# Patient Record
Sex: MALE | Race: WHITE | NOT HISPANIC OR LATINO | Employment: OTHER | URBAN - METROPOLITAN AREA
[De-identification: names, ages, dates, MRNs, and addresses within clinical notes are randomized per-mention and may not be internally consistent; named-entity substitution may affect disease eponyms.]

---

## 2018-06-20 ENCOUNTER — OFFICE VISIT (OUTPATIENT)
Dept: NEUROLOGY | Facility: MEDICAL CENTER | Age: 68
End: 2018-06-20
Payer: MEDICARE

## 2018-06-20 VITALS
OXYGEN SATURATION: 96 % | HEART RATE: 61 BPM | SYSTOLIC BLOOD PRESSURE: 116 MMHG | TEMPERATURE: 97.9 F | DIASTOLIC BLOOD PRESSURE: 62 MMHG | HEIGHT: 72 IN | WEIGHT: 196.21 LBS | BODY MASS INDEX: 26.58 KG/M2

## 2018-06-20 DIAGNOSIS — R41.3 MEMORY PROBLEM: ICD-10-CM

## 2018-06-20 DIAGNOSIS — G62.9 NEUROPATHY: ICD-10-CM

## 2018-06-20 DIAGNOSIS — R41.82 ALTERED MENTAL STATUS, UNSPECIFIED ALTERED MENTAL STATUS TYPE: ICD-10-CM

## 2018-06-20 PROCEDURE — 99204 OFFICE O/P NEW MOD 45 MIN: CPT | Performed by: PSYCHIATRY & NEUROLOGY

## 2018-06-20 RX ORDER — METOPROLOL SUCCINATE 25 MG/1
25 TABLET, EXTENDED RELEASE ORAL DAILY
COMMUNITY

## 2018-06-20 RX ORDER — TRAZODONE HYDROCHLORIDE 150 MG/1
150 TABLET ORAL NIGHTLY
COMMUNITY

## 2018-06-20 RX ORDER — FLUPHENAZINE HYDROCHLORIDE 2.5 MG/1
2.5 TABLET ORAL DAILY
COMMUNITY

## 2018-06-20 ASSESSMENT — PATIENT HEALTH QUESTIONNAIRE - PHQ9: CLINICAL INTERPRETATION OF PHQ2 SCORE: 0

## 2018-06-20 NOTE — PROGRESS NOTES
"CC: Altered Mental Status      HPI:    Kaushik Siegel is a 67 y.o. male with pmhx of schizophrenia, thrombocytopenia, dacryocystitis, hld, htn, hematuria,  who presents today in initial neurologic consultation for altered mental status consisting of confusion and forgetfulness since x 6 months with urinary incontinence over the past 3-4 months. He was referred by his primary care provider CAMELIA Selby, and is accompanied by his care coordinator, Emelia.     Mr. Siegel currently lives in a provider home and has for many years. He is well known to the staff, and recently, has been exhibiting different behaviors which they are concerned are different from his baseline.     He has not been sleeping despite 150mg of Trazodone. His , Emelia tells me that he has been very forgetful. She has known him over one year. She has noticed that he is getting worse. He has been with [facility] for about 5 years. He has been waking up at night and cooking lots of meals for their \"32,500 babies\" and his \"wife\" Salena - although he does not really have a wife. He does not eat the meals that he cooks. The staff at [facility] reports that he sleeps for only a couple hours at a time and then is up, going out, smoking cigarettes. He has been exhibiting more forgetfulness, not knowing what simple things mean. Had been trying to sign up for his post in [place], but he was never in the . He also takes many showers per day.       ROS:   Constitutional: No fevers or chills.  Eyes: No blurry vision or eye pain.  ENT: No dysphagia or hearing loss.  Respiratory: No cough or shortness of breath.  Cardiovascular: No chest pain or palpitations.  GI: No nausea, vomiting, or diarrhea.  : No urinary incontinence or dysuria.  Musculoskeletal: No joint swelling or arthralgias.  Skin: No skin rashes.  Neuro: No headaches, dizziness, or tremors.  Psych: +hallucinations.      Past Medical History:   Past Medical History: "   Diagnosis Date   • Depression    • Hyperlipemia    • Hypertension    • Schizophrenia (HCC)        Past Surgical History:   Past Surgical History:   Procedure Laterality Date   • OTHER ORTHOPEDIC SURGERY      R arm   • TONSILLECTOMY         Social History:   Social History     Social History   • Marital status: Single     Spouse name: N/A   • Number of children: N/A   • Years of education: N/A     Occupational History   • Not on file.     Social History Main Topics   • Smoking status: Current Every Day Smoker     Packs/day: 1.00     Years: 46.00     Types: Cigarettes   • Smokeless tobacco: Never Used   • Alcohol use No   • Drug use: No   • Sexual activity: Not on file     Other Topics Concern   •  Service No     Social History Narrative   • No narrative on file       Allergies: No Known Allergies  Ambulatory Vitals  Encounter Vitals  Temperature: 36.6 °C (97.9 °F)  Temp Source: Temporal  Blood Pressure : 116/62  BP Location: Upper Arm  Patient BP Position: Sitting  Pulse: 61  Pulse Oximetry: 96 %  Weight: 89 kg (196 lb 3.4 oz)  Weight Source: Stand Up Scale  Height: 182.9 cm (6')  BMI (Calculated): 26.61      Physical Exam:   Constitutional: Elder appearing male.   Cardiovascular: RRR, with no murmurs, rubs or gallops. No carotid bruits.   Respiratory: Lungs CTA B/L, no W/R/R.   Abdomen: Soft Non-tender to Palpation. Non-distended.  Extremities: No peripheral edema, pedal pulses intact.   Skin: Warm, dry, intact. No rashes observed.  Eyes: Sclera anicteric  Neurologic:   Mental Status: Awake, alert, oriented x 3.   Speech: Fluent with normal prosody.   Concentration: At times inattentive, distracted, talking to hallucinations.   Cranial Nerves:    CN II: PERRL. No afferent pupillary defect.    CN III, IV, VI: Good eye closure, EOMI.     CN V: Facial sensation intact and symmetric.     CN VII: No facial asymmetry.     CN VIII: Hearing intact.     CN IX and X: Palate elevates symmetrically. Normal gag  reflex.    CN XI: Symmetric shoulder shrug.     CN XII: Tongue midline.    Sensory: Decreased vibration sense in bilateral feet. Intact temperature.   Coordination: No evidence of past-pointing on finger to nose testing, no dysdiadochokinesia. Heel to shin intact.             DTR's: 2+ throughout without clonus.    Babinski: Toes downgoing bilaterally.   Romberg: Negative.   Movements: No tremors observed.   Musculoskeletal:    Strength: 5/5 in upper and lower extremities bilaterally.   Gait: Steady, narrow based.    Tone: Normal bulk and tone.   Joints: No swelling.     Assessment/Plan:  Memory problem  66 yo M with pmhx of schizophrenia, depression, hld, and htn, presents with 6 months of increasing confusion and forgetfulness with urinary incontinence over the past 3-4 months. Given these recent symptoms, I recommended we start with a brain MRI and lab studies to include reversible causes of dementia.     Plan:  1. Check brain MRI w/wo contrast  2. Check TSH, B12, folate, CBC, CMP, RPR    Altered mental status  Altered mental status with urinary incontinence. Checking brain MRI. Would also check EEG.     Plan:  1. Referral for routine EEG.     Neuropathy (HCC)  Checking vitamin B12, folate, HIV, hepatitis, Serum Protein Electrophoresis w immunofixation.          Verito Malagon D.O., M.P.H  MS specialist.   Board Certified Neurologist.  Neurology Clerkship Director, [facility].    Neurology,  [facility].   Tel: [phone]  Fax: [phone]

## 2018-07-08 PROBLEM — R41.82 ALTERED MENTAL STATUS: Status: ACTIVE | Noted: 2018-07-08

## 2018-07-08 PROBLEM — R41.3 MEMORY PROBLEM: Status: ACTIVE | Noted: 2018-07-08

## 2018-07-08 PROBLEM — G62.9 NEUROPATHY: Status: ACTIVE | Noted: 2018-07-08

## 2018-07-08 NOTE — ASSESSMENT & PLAN NOTE
Altered mental status with urinary incontinence. Checking brain MRI. Would also check EEG.     Plan:  1. Referral for routine EEG.

## 2018-07-08 NOTE — ASSESSMENT & PLAN NOTE
68 yo M with pmhx of schizophrenia, depression, hld, and htn, presents with 6 months of increasing confusion and forgetfulness with urinary incontinence over the past 3-4 months. Given these recent symptoms, I recommended we start with a brain MRI and lab studies to include reversible causes of dementia.     Plan:  1. Check brain MRI w/wo contrast  2. Check TSH, B12, folate, CBC, CMP, RPR

## 2018-07-12 ENCOUNTER — HOSPITAL ENCOUNTER (OUTPATIENT)
Dept: RADIOLOGY | Facility: MEDICAL CENTER | Age: 68
End: 2018-07-12
Attending: PSYCHIATRY & NEUROLOGY
Payer: MEDICARE

## 2018-07-12 ENCOUNTER — HOSPITAL ENCOUNTER (OUTPATIENT)
Dept: LAB | Facility: MEDICAL CENTER | Age: 68
End: 2018-07-12
Attending: PSYCHIATRY & NEUROLOGY
Payer: MEDICARE

## 2018-07-12 DIAGNOSIS — R41.3 MEMORY PROBLEM: ICD-10-CM

## 2018-07-12 DIAGNOSIS — R41.82 ALTERED MENTAL STATUS, UNSPECIFIED ALTERED MENTAL STATUS TYPE: ICD-10-CM

## 2018-07-12 DIAGNOSIS — G62.9 NEUROPATHY: ICD-10-CM

## 2018-07-12 LAB
ALBUMIN SERPL BCP-MCNC: 4.3 G/DL (ref 3.2–4.9)
ALBUMIN/GLOB SERPL: 2 G/DL
ALP SERPL-CCNC: 55 U/L (ref 30–99)
ALT SERPL-CCNC: 15 U/L (ref 2–50)
ANION GAP SERPL CALC-SCNC: 7 MMOL/L (ref 0–11.9)
AST SERPL-CCNC: 18 U/L (ref 12–45)
BASOPHILS # BLD AUTO: 0.4 % (ref 0–1.8)
BASOPHILS # BLD: 0.03 K/UL (ref 0–0.12)
BILIRUB SERPL-MCNC: 0.8 MG/DL (ref 0.1–1.5)
BUN SERPL-MCNC: 34 MG/DL (ref 8–22)
CALCIUM SERPL-MCNC: 9.1 MG/DL (ref 8.5–10.5)
CHLORIDE SERPL-SCNC: 102 MMOL/L (ref 96–112)
CO2 SERPL-SCNC: 30 MMOL/L (ref 20–33)
CREAT BLD-MCNC: 1.5 MG/DL (ref 0.5–1.4)
CREAT SERPL-MCNC: 1.46 MG/DL (ref 0.5–1.4)
EOSINOPHIL # BLD AUTO: 0.1 K/UL (ref 0–0.51)
EOSINOPHIL NFR BLD: 1.3 % (ref 0–6.9)
ERYTHROCYTE [DISTWIDTH] IN BLOOD BY AUTOMATED COUNT: 50.4 FL (ref 35.9–50)
EST. AVERAGE GLUCOSE BLD GHB EST-MCNC: 108 MG/DL
FOLATE SERPL-MCNC: 13.2 NG/ML
GLOBULIN SER CALC-MCNC: 2.2 G/DL (ref 1.9–3.5)
GLUCOSE SERPL-MCNC: 77 MG/DL (ref 65–99)
HAV IGM SERPL QL IA: NEGATIVE
HBA1C MFR BLD: 5.4 % (ref 0–5.6)
HBV CORE IGM SER QL: NEGATIVE
HBV SURFACE AG SER QL: NEGATIVE
HCT VFR BLD AUTO: 48.7 % (ref 42–52)
HCV AB SER QL: NEGATIVE
HGB BLD-MCNC: 16.5 G/DL (ref 14–18)
IMM GRANULOCYTES # BLD AUTO: 0.01 K/UL (ref 0–0.11)
IMM GRANULOCYTES NFR BLD AUTO: 0.1 % (ref 0–0.9)
LYMPHOCYTES # BLD AUTO: 4.5 K/UL (ref 1–4.8)
LYMPHOCYTES NFR BLD: 57.3 % (ref 22–41)
MCH RBC QN AUTO: 34.5 PG (ref 27–33)
MCHC RBC AUTO-ENTMCNC: 33.9 G/DL (ref 33.7–35.3)
MCV RBC AUTO: 101.9 FL (ref 81.4–97.8)
MONOCYTES # BLD AUTO: 0.33 K/UL (ref 0–0.85)
MONOCYTES NFR BLD AUTO: 4.2 % (ref 0–13.4)
NEUTROPHILS # BLD AUTO: 2.89 K/UL (ref 1.82–7.42)
NEUTROPHILS NFR BLD: 36.7 % (ref 44–72)
NRBC # BLD AUTO: 0 K/UL
NRBC BLD-RTO: 0 /100 WBC
PLATELET # BLD AUTO: 66 K/UL (ref 164–446)
PMV BLD AUTO: 12.8 FL (ref 9–12.9)
POTASSIUM SERPL-SCNC: 4.2 MMOL/L (ref 3.6–5.5)
PROT SERPL-MCNC: 6.5 G/DL (ref 6–8.2)
RBC # BLD AUTO: 4.78 M/UL (ref 4.7–6.1)
SODIUM SERPL-SCNC: 139 MMOL/L (ref 135–145)
TREPONEMA PALLIDUM IGG+IGM AB [PRESENCE] IN SERUM OR PLASMA BY IMMUNOASSAY: NON REACTIVE
TSH SERPL DL<=0.005 MIU/L-ACNC: 1.69 UIU/ML (ref 0.38–5.33)
VIT B12 SERPL-MCNC: 334 PG/ML (ref 211–911)
WBC # BLD AUTO: 7.9 K/UL (ref 4.8–10.8)

## 2018-07-12 PROCEDURE — 86780 TREPONEMA PALLIDUM: CPT

## 2018-07-12 PROCEDURE — 80053 COMPREHEN METABOLIC PANEL: CPT

## 2018-07-12 PROCEDURE — 84160 ASSAY OF PROTEIN ANY SOURCE: CPT

## 2018-07-12 PROCEDURE — 87535 HIV-1 PROBE&REVERSE TRNSCRPJ: CPT

## 2018-07-12 PROCEDURE — 84443 ASSAY THYROID STIM HORMONE: CPT

## 2018-07-12 PROCEDURE — 82746 ASSAY OF FOLIC ACID SERUM: CPT

## 2018-07-12 PROCEDURE — 80074 ACUTE HEPATITIS PANEL: CPT

## 2018-07-12 PROCEDURE — 700117 HCHG RX CONTRAST REV CODE 255: Performed by: PSYCHIATRY & NEUROLOGY

## 2018-07-12 PROCEDURE — A9585 GADOBUTROL INJECTION: HCPCS | Performed by: PSYCHIATRY & NEUROLOGY

## 2018-07-12 PROCEDURE — 82607 VITAMIN B-12: CPT

## 2018-07-12 PROCEDURE — 85025 COMPLETE CBC W/AUTO DIFF WBC: CPT

## 2018-07-12 PROCEDURE — 36415 COLL VENOUS BLD VENIPUNCTURE: CPT

## 2018-07-12 PROCEDURE — 82565 ASSAY OF CREATININE: CPT

## 2018-07-12 PROCEDURE — 84165 PROTEIN E-PHORESIS SERUM: CPT

## 2018-07-12 PROCEDURE — 70553 MRI BRAIN STEM W/O & W/DYE: CPT

## 2018-07-12 PROCEDURE — 83036 HEMOGLOBIN GLYCOSYLATED A1C: CPT

## 2018-07-12 RX ORDER — GADOBUTROL 604.72 MG/ML
7.5 INJECTION INTRAVENOUS ONCE
Status: COMPLETED | OUTPATIENT
Start: 2018-07-12 | End: 2018-07-12

## 2018-07-12 RX ADMIN — GADOBUTROL 7.5 ML: 604.72 INJECTION INTRAVENOUS at 10:10

## 2018-07-14 LAB
ALBUMIN SERPL-MCNC: 4.29 G/DL (ref 3.75–5.01)
ALPHA1 GLOB SERPL ELPH-MCNC: 0.26 G/DL (ref 0.19–0.46)
ALPHA2 GLOB SERPL ELPH-MCNC: 0.76 G/DL (ref 0.48–1.05)
B-GLOBULIN SERPL ELPH-MCNC: 0.66 G/DL (ref 0.48–1.1)
GAMMA GLOB SERPL ELPH-MCNC: 0.92 G/DL (ref 0.62–1.51)
INTERPRETATION SERPL IFE-IMP: NORMAL
MONOCLON BAND OBS SERPL: NORMAL
PATHOLOGY STUDY: NORMAL
PROT SERPL-MCNC: 6.9 G/DL (ref 6–8.3)

## 2018-07-17 LAB — HIV 1 PRO DNA BLD QL NAA+PROBE: NOT DETECTED

## 2018-09-20 PROBLEM — D69.6 THROMBOCYTOPENIA (HCC): Status: ACTIVE | Noted: 2018-09-20

## 2018-09-20 PROBLEM — N19 RENAL FAILURE: Status: ACTIVE | Noted: 2018-09-20

## 2018-10-15 ENCOUNTER — APPOINTMENT (OUTPATIENT)
Dept: NEUROLOGY | Facility: MEDICAL CENTER | Age: 68
End: 2018-10-15
Payer: MEDICARE

## 2018-10-15 ENCOUNTER — NON-PROVIDER VISIT (OUTPATIENT)
Dept: NEUROLOGY | Facility: MEDICAL CENTER | Age: 68
End: 2018-10-15
Payer: MEDICARE

## 2018-10-15 DIAGNOSIS — R41.82 MENTAL STATUS, DECREASED: ICD-10-CM

## 2018-10-15 PROCEDURE — 95951 EEG: CPT | Mod: 52 | Performed by: PSYCHIATRY & NEUROLOGY

## 2018-10-15 NOTE — PROCEDURES
VIDEO ELECTROENCEPHALOGRAM / EPILEPSY MONITORING UNIT REPORT      Referring provider: Dr. Malagon    DOS:   10/15/2018      INDICATION:  Kaushik Siegel 67 y.o. male presenting with Referral from Dr. Malagon for VEEG. Dx AMS. Pt with PMH of schizophrenia, thrombocytopenia, HTN, dacryocystitis. Pt lives in group home Sx reported of confusion and forgetfulness for the last 6, months. Study R/O subclinical seizures    CURRENT ANTIEPILEPTIC REGIMEN: NONE     DURATION: 24 minutes      TECHNIQUE: A 30-channel video electroencephalogram (VEEG) was performed in accordance with the international 10-20 system. This digital study was reviewed in bipolar and referential montages. The recording examined the patient during wakeful, drowsy and sleep states.         DESCRIPTION OF THE RECORD:  During the awake state, background shows symmetrical 10-11 Hz alpha activity posteriorly with amplitude of 70 mV.  There was reactivity with eye opening/closure.  Normal anterior-posterior gradient was noted with faster beta frequencies seen anteriorly.  During drowsiness, high-amplitude delta frequencies were seen. There are few sharp waves over left temporal -- wicket spikes vs epileptiform ( I favor wicket spikes since no focal slowing)    During the sleep state, background shows diffuse high-amplitude 4-5 Hz delta activity.  Symmetrical high-amplitude sleep spindles and vertex sharp activities were seen in the central leads.    ACTIVATION PROCEDURES:   Hyperventilation was performed by the patient. The technician performing the test noted good effort. This technique produced electroencephalographic changes in keeping with the expected bilaterally synchronous, frontally predominant, high amplitude slow waves build up.     Intermittent Photic stimulation was performed in a stepwise fashion from 1 to 30 Hz and elicited a normal response (photic driving), most noticeable in the posterior leads.      ICTAL AND/OR INTERICTAL FINDINGS:   No focal or  generalized epileptiform activity was noted. No regional slowing was seen during this study.  No seizures were recorded during the study.     EVENT(S):  NONE    EKG: sampling review of EKG recording demonstrated irregular rhythm      INTERPRETATION:       ________________________________________________________________________    This is normal routine video EEG recording in the awake and drowsy/sleep state(s).    This scalp video EEG remains not remarkable (despite cardiac arrhthymias and probable wicket spikes)    Of note, unremarkable EEG does not completely exclude the diagnosis  of seizures since seizure is an episodic phenomena and frontal lobe seizures could have normal scalp EEG. Clinical correlation may help     If clinical suspicion of seizure remains high.  Prolonged EEG   monitoring may be of help.    ________________________________________________________________________

## 2018-12-19 ENCOUNTER — OFFICE VISIT (OUTPATIENT)
Dept: NEUROLOGY | Facility: MEDICAL CENTER | Age: 68
End: 2018-12-19
Payer: MEDICARE

## 2018-12-19 VITALS
HEIGHT: 72 IN | SYSTOLIC BLOOD PRESSURE: 116 MMHG | TEMPERATURE: 98.2 F | OXYGEN SATURATION: 94 % | HEART RATE: 95 BPM | BODY MASS INDEX: 25.19 KG/M2 | DIASTOLIC BLOOD PRESSURE: 64 MMHG | WEIGHT: 186 LBS

## 2018-12-19 DIAGNOSIS — D69.6 THROMBOCYTOPENIA (HCC): ICD-10-CM

## 2018-12-19 DIAGNOSIS — R41.82 ALTERED MENTAL STATUS, UNSPECIFIED ALTERED MENTAL STATUS TYPE: ICD-10-CM

## 2018-12-19 PROCEDURE — 99215 OFFICE O/P EST HI 40 MIN: CPT | Performed by: PSYCHIATRY & NEUROLOGY

## 2018-12-20 NOTE — PROGRESS NOTES
"CC:       HPI:    Kaushik Siegel is a 67 y.o. male with pmhx of schizophrenia, thrombocytopenia, hld, htn and hematuria who presents today from his group home with one of his case workers who provides his history. He is here for follow up for Neurological follow up for altered mental status. He was last seen on 6/20/18.     Since his last visit, he has had bloodwork and an MRI of his brain. He has thrombocytopenia and an elevated MCV. The brain MRI did not show any focal abnormalities.     His  describes the \"altered mental status.\" He says that Mr. Siegel has weeks where he is very witty and sharp. Then he has days of confusion. He perseverates on things and seems to have worsened short term memory. For instance, he continuously asks for cigarettes not seeming to remember that he had one about 20 minutes prior. He has had changes in his attitude, getting upset at times. .     ROS:   Constitutional: No fevers or chills.  Eyes: No blurry vision or eye pain.  ENT: No dysphagia or hearing loss.  Respiratory: No cough or shortness of breath.  Cardiovascular: No chest pain or palpitations.  GI: No nausea, vomiting, or diarrhea.  : No urinary incontinence or dysuria.  Musculoskeletal: No joint swelling or arthralgias.  Skin: No skin rashes.  Neuro: No headaches, dizziness, or tremors.  Endocrine: No heat or cold intolerance. No polydipsia or polyuria.  Psych: No depression or anxiety.  Heme/Lymph: No easy bruising or swollen lymph nodes.      Past Medical History:   Past Medical History:   Diagnosis Date   • Depression    • Hyperlipemia    • Hypertension    • Schizophrenia (HCC)        Past Surgical History:   Past Surgical History:   Procedure Laterality Date   • OTHER ORTHOPEDIC SURGERY      R arm   • TONSILLECTOMY         Social History:   Social History     Social History   • Marital status: Single     Spouse name: N/A   • Number of children: N/A   • Years of education: N/A     Occupational History   • " Not on file.     Social History Main Topics   • Smoking status: Current Every Day Smoker     Packs/day: 1.00     Years: 46.00     Types: Cigarettes   • Smokeless tobacco: Never Used   • Alcohol use No   • Drug use: No   • Sexual activity: Not on file     Other Topics Concern   •  Service No     Social History Narrative   • No narrative on file       Family History:   Family History   Problem Relation Age of Onset   • Family history unknown: Yes       Allergies: No Known Allergies    Physical Exam:     Ambulatory Vitals  Encounter Vitals  Temperature: 36.8 °C (98.2 °F)  Temp src: Temporal  Blood Pressure : 116/64  Pulse: 95  Pulse Oximetry: 94 %  Weight: 84.4 kg (186 lb)  Height: 182.9 cm (6')  BMI (Calculated): 25.23  Constitutional: Well-developed, well-nourished, good hygiene. Appears stated age.  Cardiovascular: RRR, with no murmurs, rubs or gallops. No carotid bruits.   Respiratory: Lungs CTA B/L, no W/R/R.   Extremities: No peripheral edema, pedal pulses intact.   Skin: Warm, dry, intact. No rashes observed.  Eyes: Sclera anicteric  Neurologic:   Mental Status: Awake, alert, oriented x 3.   Speech: Fluent with normal prosody.    Concentration: Able to follow multi-step commands.              Fund of Knowledge: Appropriate   Cranial Nerves:    CN II: PERRL. No afferent pupillary defect.    CN III, IV, VI: Good eye closure, EOMI.     CN V: Facial sensation intact and symmetric.     CN VII: No facial asymmetry.     CN VIII: Hearing intact.     CN IX and X: Palate elevates symmetrically. Normal gag reflex.    CN XI: Symmetric shoulder shrug.     CN XII: Tongue midline.    Sensory: Intact light touch, vibration and temperature.    Coordination: No evidence of past-pointing on finger to nose testing, no dysdiadochokinesia. Heel to shin intact.             DTR's: 2+ throughout without clonus.    Babinski: Toes downgoing bilaterally.   Romberg: Negative.   Movements: No tremors observed.   Musculoskeletal:  "   Strength: 5/5 in upper and lower extremities bilaterally.   Gait: Slow shuffling gait.    Tone: Normal bulk and tone. No Cogwheeling.   Joints: No swelling.   Psychiatric: Tangential and talking nonsensically about the \"thousands of babies that live in his group home.\"     Labs Reviewed:    Results for LUIS EDUARDO MEJIA (MRN [id number]) as of 12/29/2018 21:29   Ref. Range 7/12/2018 10:15   WBC Latest Ref Range: 4.8 - 10.8 K/uL 7.9   RBC Latest Ref Range: 4.70 - 6.10 M/uL 4.78   Hemoglobin Latest Ref Range: 14.0 - 18.0 g/dL 16.5   Hematocrit Latest Ref Range: 42.0 - 52.0 % 48.7   MCV Latest Ref Range: 81.4 - 97.8 fL 101.9 (H)   MCH Latest Ref Range: 27.0 - 33.0 pg 34.5 (H)   MCHC Latest Ref Range: 33.7 - 35.3 g/dL 33.9   RDW Latest Ref Range: 35.9 - 50.0 fL 50.4 (H)   Platelet Count Latest Ref Range: 164 - 446 K/uL 66 (L)   MPV Latest Ref Range: 9.0 - 12.9 fL 12.8   Neutrophils-Polys Latest Ref Range: 44.00 - 72.00 % 36.70 (L)   Neutrophils (Absolute) Latest Ref Range: 1.82 - 7.42 K/uL 2.89   Lymphocytes Latest Ref Range: 22.00 - 41.00 % 57.30 (H)   Lymphs (Absolute) Latest Ref Range: 1.00 - 4.80 K/uL 4.50   Monocytes Latest Ref Range: 0.00 - 13.40 % 4.20   Monos (Absolute) Latest Ref Range: 0.00 - 0.85 K/uL 0.33   Eosinophils Latest Ref Range: 0.00 - 6.90 % 1.30   Eos (Absolute) Latest Ref Range: 0.00 - 0.51 K/uL 0.10   Basophils Latest Ref Range: 0.00 - 1.80 % 0.40   Baso (Absolute) Latest Ref Range: 0.00 - 0.12 K/uL 0.03   Immature Granulocytes Latest Ref Range: 0.00 - 0.90 % 0.10   Immature Granulocytes (abs) Latest Ref Range: 0.00 - 0.11 K/uL 0.01   Nucleated RBC Latest Units: /100 WBC 0.00   NRBC (Absolute) Latest Units: K/uL 0.00   Sodium Latest Ref Range: 135 - 145 mmol/L 139   Potassium Latest Ref Range: 3.6 - 5.5 mmol/L 4.2   Chloride Latest Ref Range: 96 - 112 mmol/L 102   Co2 Latest Ref Range: 20 - 33 mmol/L 30   Anion Gap Latest Ref Range: 0.0 - 11.9  7.0   Glucose Latest Ref Range: 65 - 99 mg/dL 77   Bun " Latest Ref Range: 8 - 22 mg/dL 34 (H)   Creatinine Latest Ref Range: 0.50 - 1.40 mg/dL 1.46 (H)   GFR If  Latest Ref Range: >60 mL/min/1.73 m 2 58 (A)   GFR If Non  Latest Ref Range: >60 mL/min/1.73 m 2 48 (A)   Calcium Latest Ref Range: 8.5 - 10.5 mg/dL 9.1   AST(SGOT) Latest Ref Range: 12 - 45 U/L 18   ALT(SGPT) Latest Ref Range: 2 - 50 U/L 15   Alkaline Phosphatase Latest Ref Range: 30 - 99 U/L 55   Total Bilirubin Latest Ref Range: 0.1 - 1.5 mg/dL 0.8   Albumin Latest Ref Range: 3.2 - 4.9 g/dL 4.3   Total Protein Latest Ref Range: 6.0 - 8.2 g/dL 6.5   Globulin Latest Ref Range: 1.9 - 3.5 g/dL 2.2   A-G Ratio Latest Units: g/dL 2.0   Glycohemoglobin Latest Ref Range: 0.0 - 5.6 % 5.4   Estim. Avg Glu Latest Units: mg/dL 108   Vitamin B12 -True Cobalamin Latest Ref Range: 211 - 911 pg/mL 334   Folate -Folic Acid Latest Ref Range: >4.0 ng/mL 13.2   TSH Latest Ref Range: 0.380 - 5.330 uIU/mL 1.690   Hepatitis A Virus Ab, IgM Latest Ref Range: Negative  Negative   Hepatitis B Surface Antigen Latest Ref Range: Negative  Negative   Hepatitis B Cors Ab,IgM Latest Ref Range: Negative  Negative   Hepatitis C Antibody Latest Ref Range: Negative  Negative   HIV Dna By Pcr Latest Ref Range: Not Detected  Not Detected   Syphilis, Treponemal Qual Latest Ref Range: Non Reactive  Non Reactive   BENITEZ Reflex Unknown Not Done   Interpretation Unknown See Note   Total Protein Latest Ref Range: 6.00 - 8.30 g/dL 6.90   Albumin Latest Ref Range: 3.75 - 5.01 g/dL 4.29   Gamma Globulin Latest Ref Range: 0.62 - 1.51 g/dL 0.92   Alpha-1 Globulin Latest Ref Range: 0.19 - 0.46 g/dL 0.26   Alpha-2 Globulin Latest Ref Range: 0.48 - 1.05 g/dL 0.76   Beta Globulin Latest Ref Range: 0.48 - 1.10 g/dL 0.66   EER Serum Prot. Electro. Reflex Unknown See Note       Imaging:   Brain MRI w/wo contrast fro 7/12/18  1.  Age-related cerebral atrophy.  2.  Mild to moderate periventricular and juxtacortical white matter changes  consistent with chronic microvascular ischemic gliosis.    Assessment/Plan:  Altered mental status  67 yo M with schizophrenia, htn, hld, thrombocytopenia, and hematuria noted by group home case workers to have internittently altered mental status. Today we reviewed his brain MRI which showed only age related atrophy and a small amount of chronic small vessel ischemic change but nothing to explain altered mental status. His routine video EEG was negative for any focal sharps or spikes to suggest an nunderlying seizure disorder. While he has been antipsychotics for many years which could cause his underlying thrombocytopenia and cytomegaly, this needs to be investigated. During both of our encounters, he has been hallucinating, quietly mumbling to himself due to his schizophrenia making it difficult to assess his memory. He was awake and alert, however and able to follow directions. If there is an underlying dementia developing, that will be quite difficult to assess given his psychiatric issues. I suggested a hematology consult to investigate the thrombocytopenia and that he return to his primary care provider for further metabolic workup. If he develops new symptoms or exhibits additional pathology suggestive of seizure activity, they should return for re-evaluation so we can do prolonged EEG monitoring.    Plan:  1. Hematology referral placed  2. Return to primary care provider for further workup of metabolic caues of altered mental status.       Greater than 50% of this 40 minute face to face encounter was devoted to disease state counseling on altered mental status and coordination of care. Additional time was spent on lab and MRI review. Please see above assessment and plan for discussion.       Verito Malagon D.O., M.P.H  MS specialist.   Board Certified Neurologist.  Neurology Clerkship Director, [facility].    Neurology,  [facility]  [facility].   Tel: [phone]  Fax: [phone]

## 2018-12-30 NOTE — ASSESSMENT & PLAN NOTE
69 yo M with schizophrenia, htn, hld, thrombocytopenia, and hematuria noted by group home case workers to have internittently altered mental status. Today we reviewed his brain MRI which showed only age related atrophy and a small amount of chronic small vessel ischemic change but nothing to explain altered mental status. His routine video EEG was negative for any focal sharps or spikes to suggest an nunderlying seizure disorder. While he has been antipsychotics for many years which could cause his underlying thrombocytopenia and cytomegaly, this needs to be investigated. During both of our encounters, he has been hallucinating, quietly mumbling to himself due to his schizophrenia making it difficult to assess his memory. He was awake and alert, however and able to follow directions. If there is an underlying dementia developing, that will be quite difficult to assess given his psychiatric issues. I suggested a hematology consult to investigate the thrombocytopenia and that he return to his primary care provider for further metabolic workup. If he develops new symptoms or exhibits additional pathology suggestive of seizure activity, they should return for re-evaluation so we can do prolonged EEG monitoring.    Plan:  1. Hematology referral placed  2. Return to primary care provider for further workup of metabolic caues of altered mental status.

## 2019-02-06 ENCOUNTER — HOSPITAL ENCOUNTER (OUTPATIENT)
Dept: RADIOLOGY | Facility: MEDICAL CENTER | Age: 69
End: 2019-02-06
Attending: INTERNAL MEDICINE
Payer: MEDICARE

## 2019-02-06 DIAGNOSIS — D69.6 THROMBOCYTOPENIA, UNSPECIFIED (HCC): ICD-10-CM

## 2019-02-06 PROCEDURE — 76700 US EXAM ABDOM COMPLETE: CPT

## 2019-03-05 ENCOUNTER — HOSPITAL ENCOUNTER (OUTPATIENT)
Dept: LAB | Facility: MEDICAL CENTER | Age: 69
End: 2019-03-05
Attending: PHYSICIAN ASSISTANT
Payer: MEDICARE

## 2019-03-05 PROCEDURE — 87186 SC STD MICRODIL/AGAR DIL: CPT

## 2019-03-05 PROCEDURE — 87086 URINE CULTURE/COLONY COUNT: CPT

## 2019-03-05 PROCEDURE — 87077 CULTURE AEROBIC IDENTIFY: CPT

## 2019-03-06 LAB
AMBIGUOUS DTTM AMBI4: NORMAL
SIGNIFICANT IND 70042: NORMAL
SITE SITE: NORMAL
SOURCE SOURCE: NORMAL

## 2019-03-08 LAB
BACTERIA UR CULT: ABNORMAL
BACTERIA UR CULT: ABNORMAL
SIGNIFICANT IND 70042: ABNORMAL
SITE SITE: ABNORMAL
SOURCE SOURCE: ABNORMAL

## 2019-06-24 ENCOUNTER — OFFICE VISIT (OUTPATIENT)
Dept: NEUROLOGY | Facility: MEDICAL CENTER | Age: 69
End: 2019-06-24
Payer: MEDICARE

## 2019-06-24 VITALS
SYSTOLIC BLOOD PRESSURE: 148 MMHG | BODY MASS INDEX: 27.63 KG/M2 | DIASTOLIC BLOOD PRESSURE: 76 MMHG | WEIGHT: 204 LBS | HEIGHT: 72 IN | HEART RATE: 78 BPM | OXYGEN SATURATION: 93 % | TEMPERATURE: 98.4 F

## 2019-06-24 DIAGNOSIS — R25.1 TREMOR: ICD-10-CM

## 2019-06-24 DIAGNOSIS — R41.3 MEMORY PROBLEM: ICD-10-CM

## 2019-06-24 PROCEDURE — 99214 OFFICE O/P EST MOD 30 MIN: CPT | Performed by: PSYCHIATRY & NEUROLOGY

## 2019-06-24 RX ORDER — ATORVASTATIN CALCIUM 20 MG/1
20 TABLET, FILM COATED ORAL NIGHTLY
COMMUNITY

## 2019-06-24 RX ORDER — TAMSULOSIN HYDROCHLORIDE 0.4 MG/1
0.4 CAPSULE ORAL
COMMUNITY

## 2019-06-24 ASSESSMENT — ENCOUNTER SYMPTOMS
FEVER: 0
NAUSEA: 0
DIARRHEA: 0
VOMITING: 0
SHORTNESS OF BREATH: 0
CONSTIPATION: 0
CHILLS: 0
DOUBLE VISION: 0
MEMORY LOSS: 1
BLURRED VISION: 0

## 2019-06-24 ASSESSMENT — PATIENT HEALTH QUESTIONNAIRE - PHQ9: CLINICAL INTERPRETATION OF PHQ2 SCORE: 0

## 2019-06-24 NOTE — ASSESSMENT & PLAN NOTE
Mr. Siegel is a 68-year-old man with a past medical history of schizophrenia, on lifelong antipsychotic medications, who has developed an intermittent right hand involuntary tremor over the past 6 months which appears somewhat parkinsonian.  During our examination today, there were periods when the tremor was not happening at all, and then it suddenly came on with a somewhat exaggerated amplitude.  I am concerned this might work present some form of parkinsonism.  It is very difficult to discern whether the increased hallucinations he has been experiencing may be part of a early Lewy body dementia.  I recommended follow-up with our cognitive specialist, Dr. Grady in 3 months.  If the tremor worsens, persists, or becomes more continuous and interruptive to his functioning, he may benefit from a trial of Sinemet.

## 2019-09-16 ENCOUNTER — HOSPITAL ENCOUNTER (OUTPATIENT)
Facility: MEDICAL CENTER | Age: 69
End: 2019-09-16
Attending: UROLOGY | Admitting: UROLOGY
Payer: MEDICARE

## 2019-09-23 RX ORDER — SODIUM CHLORIDE, SODIUM LACTATE, POTASSIUM CHLORIDE, CALCIUM CHLORIDE 600; 310; 30; 20 MG/100ML; MG/100ML; MG/100ML; MG/100ML
INJECTION, SOLUTION INTRAVENOUS CONTINUOUS
Status: CANCELLED | OUTPATIENT
Start: 2019-09-23 | End: 2019-09-23

## 2019-10-21 ENCOUNTER — OFFICE VISIT (OUTPATIENT)
Dept: NEUROLOGY | Facility: MEDICAL CENTER | Age: 69
End: 2019-10-21
Payer: MEDICARE

## 2019-10-21 VITALS
WEIGHT: 195 LBS | DIASTOLIC BLOOD PRESSURE: 62 MMHG | HEART RATE: 66 BPM | HEIGHT: 72 IN | OXYGEN SATURATION: 96 % | BODY MASS INDEX: 26.41 KG/M2 | RESPIRATION RATE: 14 BRPM | TEMPERATURE: 97.3 F | SYSTOLIC BLOOD PRESSURE: 118 MMHG

## 2019-10-21 DIAGNOSIS — G20.C PARKINSONISM, UNSPECIFIED PARKINSONISM TYPE: ICD-10-CM

## 2019-10-21 DIAGNOSIS — R41.82 ALTERED MENTAL STATUS, UNSPECIFIED ALTERED MENTAL STATUS TYPE: ICD-10-CM

## 2019-10-21 DIAGNOSIS — R25.1 TREMOR: ICD-10-CM
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
## 2019-10-21 DIAGNOSIS — R26.81 GAIT INSTABILITY: ICD-10-CM

## 2019-10-21 PROCEDURE — 99215 OFFICE O/P EST HI 40 MIN: CPT

## 2019-10-21 ASSESSMENT — ENCOUNTER SYMPTOMS
EYES NEGATIVE: 1
HALLUCINATIONS: 1
RESPIRATORY NEGATIVE: 1
TREMORS: 1
DEPRESSION: 1
CARDIOVASCULAR NEGATIVE: 1
MUSCULOSKELETAL NEGATIVE: 1
GASTROINTESTINAL NEGATIVE: 1
CONSTITUTIONAL NEGATIVE: 1

## 2019-10-21 NOTE — PROGRESS NOTES
Kaushik Siegel is a 68 y.o. male with pmhx of schizophrenia, thrombocytopenia, hld, htn and hematuria who presents today from his group home with one of his case workers who provides his history. He is here for follow up for Neurological follow up for altered mental status. He was last seen by Dr Malagon.     He has followed up with Dr. Simon of hematology for his Thrombocytopenia and elevated MCV.  These were thought to be the result of prior liver injury possibly from medications, as well as from the medications themselves.     He has developed a right hand tremor intermittently on a daily basis over the past few months. He seems to be more interactive with his hallucinations as well. He sees apparitions around the house. He also seems more stiff going up and down stairs.  The tremor comes and goes throughout the day.  He has dropped items from his hand when it comes on. His  accompanies him to today's visit and reports more noticeable short term memory problems. When staff asks him to do a task, he seems to have more difficulty remembering to do it.    He has short term memory loss and would forget tasks he needs to accomplish.  The tremor in his hands is worse in the past 1-2 months it is action and also emerges when he walks and worse in his left had.he feels stiff.Decreased facial mimics.  His blood pressure medication was changed over the last 6 months. Clonazepam was reduced. Blood pressure medication was changed- he switched from Lovastatin to Atorvastatin.     Forgets recent events and short term memory loss.      Review of Systems   Constitutional: Negative.    HENT: Negative.    Eyes: Negative.    Respiratory: Negative.    Cardiovascular: Negative.    Gastrointestinal: Negative.    Genitourinary: Negative.    Musculoskeletal: Negative.    Skin: Negative.    Neurological: Positive for tremors.   Endo/Heme/Allergies: Negative.    Psychiatric/Behavioral: Positive for depression and  hallucinations.     Past Medical History:   Diagnosis Date   • Depression    • Hyperlipemia    • Hypertension    • Schizophrenia (HCC)      Past Surgical History:   Procedure Laterality Date   • OTHER ORTHOPEDIC SURGERY      R arm   • TONSILLECTOMY       Current Outpatient Medications on File Prior to Visit   Medication Sig Dispense Refill   • tamsulosin (FLOMAX) 0.4 MG capsule Take 0.4 mg by mouth ONE-HALF HOUR AFTER BREAKFAST.     • atorvastatin (LIPITOR) 20 MG Tab Take 20 mg by mouth every evening.     • fluphenazine (PROLIXIN) 2.5 MG Tab Take 2.5 mg by mouth every day.     • metoprolol SR (TOPROL XL) 25 MG TABLET SR 24 HR Take 25 mg by mouth every day.     • traZODone (DESYREL) 150 MG Tab Take 150 mg by mouth every evening.     • benztropine (COGENTIN) 1 MG TABS Take 1 mg by mouth 2 times a day.     • citalopram (CELEXA) 20 MG TABS Take 20 mg by mouth every day.     • clonazepam (KLONOPIN) 0.5 MG TABS Take 0.25 mg by mouth every evening.     • enalapril (VASOTEC) 20 MG tablet Take 20 mg by mouth 2 times a day.     • hydrochlorothiazide (HYDRODIURIL) 25 MG TABS Take 25 mg by mouth every day.     • ziprasidone (GEODON) 80 MG CAPS Take 80 mg by mouth 2 Times a Day.     • lovastatin (MEVACOR) 20 MG TABS Take 20 mg by mouth every evening.     • melatonin 3 MG TABS Take 3 mg by mouth every bedtime.       No current facility-administered medications on file prior to visit.      Past Medical History:   Diagnosis Date   • Depression    • Hyperlipemia    • Hypertension    • Schizophrenia (HCC)      Past Surgical History:   Procedure Laterality Date   • OTHER ORTHOPEDIC SURGERY      R arm   • TONSILLECTOMY       Current Outpatient Medications on File Prior to Visit   Medication Sig Dispense Refill   • tamsulosin (FLOMAX) 0.4 MG capsule Take 0.4 mg by mouth ONE-HALF HOUR AFTER BREAKFAST.     • atorvastatin (LIPITOR) 20 MG Tab Take 20 mg by mouth every evening.     • fluphenazine (PROLIXIN) 2.5 MG Tab Take 2.5 mg by mouth  every day.     • metoprolol SR (TOPROL XL) 25 MG TABLET SR 24 HR Take 25 mg by mouth every day.     • traZODone (DESYREL) 150 MG Tab Take 150 mg by mouth every evening.     • benztropine (COGENTIN) 1 MG TABS Take 1 mg by mouth 2 times a day.     • citalopram (CELEXA) 20 MG TABS Take 20 mg by mouth every day.     • clonazepam (KLONOPIN) 0.5 MG TABS Take 0.25 mg by mouth every evening.     • enalapril (VASOTEC) 20 MG tablet Take 20 mg by mouth 2 times a day.     • hydrochlorothiazide (HYDRODIURIL) 25 MG TABS Take 25 mg by mouth every day.     • ziprasidone (GEODON) 80 MG CAPS Take 80 mg by mouth 2 Times a Day.     • lovastatin (MEVACOR) 20 MG TABS Take 20 mg by mouth every evening.     • melatonin 3 MG TABS Take 3 mg by mouth every bedtime.       No current facility-administered medications on file prior to visit.      Social History     Socioeconomic History   • Marital status:      Spouse name: Not on file   • Number of children: Not on file   • Years of education: Not on file   • Highest education level: Not on file   Occupational History   • Not on file   Social Needs   • Financial resource strain: Not on file   • Food insecurity:     Worry: Not on file     Inability: Not on file   • Transportation needs:     Medical: Not on file     Non-medical: Not on file   Tobacco Use   • Smoking status: Current Every Day Smoker     Packs/day: 1.00     Years: 46.00     Pack years: 46.00     Types: Cigarettes   • Smokeless tobacco: Never Used   Substance and Sexual Activity   • Alcohol use: No   • Drug use: No   • Sexual activity: Not on file   Lifestyle   • Physical activity:     Days per week: Not on file     Minutes per session: Not on file   • Stress: Not on file   Relationships   • Social connections:     Talks on phone: Not on file     Gets together: Not on file     Attends Church service: Not on file     Active member of club or organization: Not on file     Attends meetings of clubs or organizations: Not on  file     Relationship status: Not on file   • Intimate partner violence:     Fear of current or ex partner: Not on file     Emotionally abused: Not on file     Physically abused: Not on file     Forced sexual activity: Not on file   Other Topics Concern   •  Service No   • Blood Transfusions Not Asked   • Caffeine Concern Not Asked   • Occupational Exposure Not Asked   • Hobby Hazards Not Asked   • Sleep Concern Not Asked   • Stress Concern Not Asked   • Weight Concern Not Asked   • Special Diet Not Asked   • Back Care Not Asked   • Exercise Not Asked   • Bike Helmet Not Asked   • Seat Belt Not Asked   • Self-Exams Not Asked   Social History Narrative   • Not on file     No Known Allergies     Encounter Vitals  Standard Vitals  Vitals  Blood Pressure : 118/62  Temperature: 36.3 °C (97.3 °F)  Temp src: Temporal  Pulse: 66  Respiration: 14  Pulse Oximetry: 96 %  Height: 182.9 cm (6')  Weight: 88.5 kg (195 lb)  Encounter Vitals  Temperature: 36.3 °C (97.3 °F)  Temp src: Temporal  Blood Pressure : 118/62  Pulse: 66  Respiration: 14  Pulse Oximetry: 96 %  Weight: 88.5 kg (195 lb)  Height: 182.9 cm (6')  BMI (Calculated): 26.45  Physical Exam  Pleasant  Tells me hi is our current president  Oriented to place, date, month   States it is 1999   Knows where he is and where and with whom he lives(group home)  Facial hypomimia  CN 2-12 intact   Decreased blinking rate   Mild cogwheeling L>R   No resting tremor   Action tremor with intentional component  Emerging tremor in L hand upon walking   Bradykinesia+  Short steps  Mild stooped posture  Decreased arm swings bl+  Turns in 2-3 steps    Imaging and labs  Lab Results   Component Value Date/Time    PROTHROMBTM 13.5 01/28/2014 08:14 PM    INR 1.01 01/28/2014 08:14 PM      Lab Results   Component Value Date/Time    SODIUM 139 07/12/2018 10:15 AM    POTASSIUM 4.2 07/12/2018 10:15 AM    CHLORIDE 102 07/12/2018 10:15 AM    CO2 30 07/12/2018 10:15 AM    GLUCOSE 77  07/12/2018 10:15 AM    BUN 34 (H) 07/12/2018 10:15 AM    CREATININE 1.46 (H) 07/12/2018 10:15 AM    imaging   Mri BRAIN W/O CONTRAST        Impression         1.  Age-related cerebral atrophy.    2.  Mild to moderate periventricular and juxtacortical white matter changes consistent with chronic microvascular ischemic gliosis.     iMPRESSION AND PLAn  Memory loss  Schizophrenia  Parkinsonism   He is on multiple antipsychotics with possible SE of parkinsonism   Doubt DLB  He has schizophrenia and ideations of grandeur are persisting   No florid delusions or formed visual hallucinations  Plan  EEG  JUAN scan   Trial of low dose sinemet  1 tab tid   We will see how he does clinically  Physical therapy due to very unsteady gait and falls      rtc 6 months

## 2019-11-15 ENCOUNTER — APPOINTMENT (OUTPATIENT)
Dept: PHYSICAL THERAPY | Facility: REHABILITATION | Age: 69
End: 2019-11-15
Payer: MEDICARE

## 2019-11-18 ENCOUNTER — APPOINTMENT (OUTPATIENT)
Dept: PHYSICAL THERAPY | Facility: REHABILITATION | Age: 69
End: 2019-11-18
Payer: MEDICARE

## 2019-11-22 ENCOUNTER — APPOINTMENT (OUTPATIENT)
Dept: PHYSICAL THERAPY | Facility: REHABILITATION | Age: 69
End: 2019-11-22
Payer: MEDICARE

## 2019-11-25 ENCOUNTER — APPOINTMENT (OUTPATIENT)
Dept: PHYSICAL THERAPY | Facility: REHABILITATION | Age: 69
End: 2019-11-25
Payer: MEDICARE

## 2019-11-26 ENCOUNTER — APPOINTMENT (OUTPATIENT)
Dept: PHYSICAL THERAPY | Facility: REHABILITATION | Age: 69
End: 2019-11-26
Payer: MEDICARE

## 2019-12-02 ENCOUNTER — APPOINTMENT (OUTPATIENT)
Dept: PHYSICAL THERAPY | Facility: REHABILITATION | Age: 69
End: 2019-12-02
Payer: MEDICARE

## 2019-12-03 ENCOUNTER — APPOINTMENT (OUTPATIENT)
Dept: PHYSICAL THERAPY | Facility: REHABILITATION | Age: 69
End: 2019-12-03
Payer: MEDICARE

## 2019-12-05 ENCOUNTER — APPOINTMENT (OUTPATIENT)
Dept: PHYSICAL THERAPY | Facility: REHABILITATION | Age: 69
End: 2019-12-05
Payer: MEDICARE

## 2019-12-06 ENCOUNTER — APPOINTMENT (OUTPATIENT)
Dept: PHYSICAL THERAPY | Facility: REHABILITATION | Age: 69
End: 2019-12-06
Payer: MEDICARE

## 2019-12-09 ENCOUNTER — APPOINTMENT (OUTPATIENT)
Dept: PHYSICAL THERAPY | Facility: REHABILITATION | Age: 69
End: 2019-12-09
Payer: MEDICARE

## 2019-12-10 ENCOUNTER — APPOINTMENT (OUTPATIENT)
Dept: PHYSICAL THERAPY | Facility: REHABILITATION | Age: 69
End: 2019-12-10
Payer: MEDICARE

## 2019-12-12 ENCOUNTER — APPOINTMENT (OUTPATIENT)
Dept: PHYSICAL THERAPY | Facility: REHABILITATION | Age: 69
End: 2019-12-12
Payer: MEDICARE

## 2019-12-13 ENCOUNTER — APPOINTMENT (OUTPATIENT)
Dept: PHYSICAL THERAPY | Facility: REHABILITATION | Age: 69
End: 2019-12-13
Payer: MEDICARE

## 2019-12-16 ENCOUNTER — APPOINTMENT (OUTPATIENT)
Dept: PHYSICAL THERAPY | Facility: REHABILITATION | Age: 69
End: 2019-12-16
Payer: MEDICARE

## 2019-12-17 ENCOUNTER — APPOINTMENT (OUTPATIENT)
Dept: PHYSICAL THERAPY | Facility: REHABILITATION | Age: 69
End: 2019-12-17
Payer: MEDICARE

## 2019-12-19 ENCOUNTER — APPOINTMENT (RX ONLY)
Dept: URBAN - METROPOLITAN AREA CLINIC 4 | Facility: CLINIC | Age: 69
Setting detail: DERMATOLOGY
End: 2019-12-19

## 2019-12-19 ENCOUNTER — APPOINTMENT (OUTPATIENT)
Dept: PHYSICAL THERAPY | Facility: REHABILITATION | Age: 69
End: 2019-12-19
Payer: MEDICARE

## 2019-12-19 PROBLEM — C44.42 SQUAMOUS CELL CARCINOMA OF SKIN OF SCALP AND NECK: Status: ACTIVE | Noted: 2019-12-19

## 2019-12-19 PROCEDURE — ? MOHS SURGERY PHONE CONSULTATION

## 2019-12-19 PROCEDURE — ? PATIENT SPECIFIC COUNSELING

## 2019-12-19 NOTE — PROCEDURE: MOHS SURGERY PHONE CONSULTATION
Time Of Mohs Surgery: 10:45 am
Has The Pathology Report Been Received?: Yes
Has The Patient Ever Received A Transplant?: No
Which Antibiotic Do They Take For Surgical Prophylaxis?: Amoxicillin (2 grams)
Date Of Mohs Surgery: 01/15/2020
Office Location Of Mohs Surgery: [facility]
Patient Reported Location: Left side scalp
Patient's Insurance: [facility]/Medicaid
Referring Provider: Ashley FERRO
Detail Level: Detailed

## 2019-12-20 ENCOUNTER — APPOINTMENT (OUTPATIENT)
Dept: PHYSICAL THERAPY | Facility: REHABILITATION | Age: 69
End: 2019-12-20
Payer: MEDICARE

## 2020-01-02 ENCOUNTER — OFFICE VISIT (OUTPATIENT)
Dept: NEUROLOGY | Facility: MEDICAL CENTER | Age: 70
End: 2020-01-02
Payer: MEDICARE

## 2020-01-02 VITALS
RESPIRATION RATE: 16 BRPM | HEIGHT: 72 IN | TEMPERATURE: 97.5 F | OXYGEN SATURATION: 97 % | WEIGHT: 205 LBS | HEART RATE: 62 BPM | BODY MASS INDEX: 27.77 KG/M2 | SYSTOLIC BLOOD PRESSURE: 120 MMHG | DIASTOLIC BLOOD PRESSURE: 60 MMHG

## 2020-01-02 DIAGNOSIS — G62.9 NEUROPATHY: ICD-10-CM

## 2020-01-02 DIAGNOSIS — R41.82 MENTAL STATUS, DECREASED: ICD-10-CM

## 2020-01-02 DIAGNOSIS — G20.C PARKINSONISM, UNSPECIFIED PARKINSONISM TYPE (HCC): ICD-10-CM
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
## 2020-01-02 DIAGNOSIS — R26.81 GAIT INSTABILITY: ICD-10-CM

## 2020-01-02 DIAGNOSIS — R41.3 MEMORY PROBLEM: ICD-10-CM

## 2020-01-02 PROCEDURE — 99215 OFFICE O/P EST HI 40 MIN: CPT

## 2020-01-02 NOTE — PROGRESS NOTES
Follow up for parkinsonism     Subjective  Tremors have been much better since Sinemet was started.  He is walking better.   He did not want to have JUAN scan but wants to continue the medication.  He is tolerating the Sinemet well.  His memory seems to be OK as well.  Energy levels are better. He wants to be more active.  He is not as depressed as he used to be.  On Sinemet  1 tab three times per day.    Kaushik Siegel is a 68 y.o. male with pmhx of schizophrenia, thrombocytopenia, hld, htn and hematuria who presents today from his group home with one of his case workers who provides his history. He is here for follow up for Neurological follow up for altered mental status. He was last seen by Dr Malagon.     He has followed up with Dr. Simon of hematology for his Thrombocytopenia and elevated MCV.  These were thought to be the result of prior liver injury possibly from medications, as well as from the medications themselves.     He has developed a right hand tremor intermittently on a daily basis over the past few months. He seems to be more interactive with his hallucinations as well. He sees apparitions around the house. He also seems more stiff going up and down stairs.  The tremor comes and goes throughout the day.  He has dropped items from his hand when it comes on. His  accompanies him to today's visit and reports more noticeable short term memory problems. When staff asks him to do a task, he seems to have more difficulty remembering to do it.    He has short term memory loss and would forget tasks he needs to accomplish.  The tremor in his hands is worse in the past 1-2 months it is action and also emerges when he walks and worse in his left had.he feels stiff.Decreased facial mimics.  His blood pressure medication was changed over the last 6 months. Clonazepam was reduced. Blood pressure medication was changed- he switched from Lovastatin to Atorvastatin.      Forgets recent events and  short term memory loss.    Review of Systems   Constitutional: Positive for malaise/fatigue.   HENT: Negative.    Eyes: Negative.    Gastrointestinal: Negative.    Genitourinary: Positive for hematuria.   Musculoskeletal: Positive for back pain and falls.   Skin: Negative.    Neurological: Positive for tremors.   Endo/Heme/Allergies: Negative.    Psychiatric/Behavioral: Positive for depression and memory loss. The patient has insomnia.      Past Medical History:   Diagnosis Date   • Depression    • Hyperlipemia    • Hypertension    • Schizophrenia (HCC)      Past Surgical History:   Procedure Laterality Date   • OTHER ORTHOPEDIC SURGERY      R arm   • TONSILLECTOMY       Family History   Family history unknown: Yes     Social History     Socioeconomic History   • Marital status:      Spouse name: Not on file   • Number of children: Not on file   • Years of education: Not on file   • Highest education level: Not on file   Occupational History   • Not on file   Social Needs   • Financial resource strain: Not on file   • Food insecurity:     Worry: Not on file     Inability: Not on file   • Transportation needs:     Medical: Not on file     Non-medical: Not on file   Tobacco Use   • Smoking status: Current Every Day Smoker     Packs/day: 1.00     Years: 46.00     Pack years: 46.00     Types: Cigarettes   • Smokeless tobacco: Never Used   Substance and Sexual Activity   • Alcohol use: No   • Drug use: No   • Sexual activity: Not on file   Lifestyle   • Physical activity:     Days per week: Not on file     Minutes per session: Not on file   • Stress: Not on file   Relationships   • Social connections:     Talks on phone: Not on file     Gets together: Not on file     Attends Voodoo service: Not on file     Active member of club or organization: Not on file     Attends meetings of clubs or organizations: Not on file     Relationship status: Not on file   • Intimate partner violence:     Fear of current or ex  partner: Not on file     Emotionally abused: Not on file     Physically abused: Not on file     Forced sexual activity: Not on file   Other Topics Concern   •  Service No   • Blood Transfusions Not Asked   • Caffeine Concern Not Asked   • Occupational Exposure Not Asked   • Hobby Hazards Not Asked   • Sleep Concern Not Asked   • Stress Concern Not Asked   • Weight Concern Not Asked   • Special Diet Not Asked   • Back Care Not Asked   • Exercise Not Asked   • Bike Helmet Not Asked   • Seat Belt Not Asked   • Self-Exams Not Asked   Social History Narrative   • Not on file     No Known Allergies   Encounter Vitals  Standard Vitals  Vitals  Blood Pressure : 120/60  Temperature: 36.4 °C (97.5 °F)  Temp src: Temporal  Pulse: 62  Respiration: 16  Pulse Oximetry: 97 %  Height: 182.9 cm (6')  Weight: 93 kg (205 lb)  Encounter Vitals  Temperature: 36.4 °C (97.5 °F)  Temp src: Temporal  Blood Pressure : 120/60  Pulse: 62  Respiration: 16  Pulse Oximetry: 97 %  Weight: 93 kg (205 lb)  Height: 182.9 cm (6')  BMI (Calculated): 27.8  Physical exam   Physical Exam  Pleasant  Tells me hi is our current president  Oriented to place, date, month   States it is 1999   Knows where he is and where and with whom he lives(group home)  Facial hypomimia  CN 2-12 intact   Decreased blinking rate   Mild cogwheeling L>R   No resting tremor   Action tremor with intentional component  Emerging tremor in L hand upon walking   Bradykinesia+  Short steps  Mild stooped posture  Decreased arm swings bl+  Turns in 2-3 steps     Imaging and labs  Lab Results  Component Value Date/Time    PROTHROMBTM 13.5 01/28/2014 08:14 PM    INR 1.01 01/28/2014 08:14 PM     Lab Results  Component Value Date/Time    SODIUM 139 07/12/2018 10:15 AM    POTASSIUM 4.2 07/12/2018 10:15 AM    CHLORIDE 102 07/12/2018 10:15 AM    CO2 30 07/12/2018 10:15 AM    GLUCOSE 77 07/12/2018 10:15 AM    BUN 34 (H) 07/12/2018 10:15 AM    CREATININE 1.46 (H) 07/12/2018 10:15 AM    imaging   Mri BRAIN W/O CONTRAST         Impression          1.  Age-related cerebral atrophy.    2.  Mild to moderate periventricular and juxtacortical white matter changes consistent with chronic microvascular ischemic gliosis.     iMPRESSION AND PLAn  Memory loss  Schizophrenia  Parkinsonism   He is on multiple antipsychotics with possible SE of parkinsonism   Doubt DLB  He has schizophrenia and ideations of grandeur are persisting   No florid delusions or formed visual hallucinations  Plan  JUAN scan   On sinemet  1 tab tid and this works very well for him, no on or off symptoms   Physical therapy due to very unsteady gait and falls        rtc 6 months

## 2020-01-08 ASSESSMENT — ENCOUNTER SYMPTOMS
MEMORY LOSS: 1
INSOMNIA: 1
TREMORS: 1
FALLS: 1
DEPRESSION: 1
EYES NEGATIVE: 1
BACK PAIN: 1
GASTROINTESTINAL NEGATIVE: 1

## 2020-01-08 NOTE — PATIENT INSTRUCTIONS
Parkinson Disease  Introduction  Parkinson disease is a long-term (chronic) condition. It gets worse over time (is progressive). Parkinson disease limits your ability:  · To control how your body moves.  · To move your body normally.  This condition affects each person differently. The condition can range from mild to very bad. This condition tends to progress slowly over many years.  Follow these instructions at home:  · Take over-the-counter and prescription medicines only as told by your doctor.  · Put grab bars and railings in your home. These help to prevent falls.  · Follow instructions from your doctor about what you can or cannot eat or drink.  · Go back to your normal activities as told by your doctor. Ask your doctor what activities are safe for you.  · Exercise as told by your doctor or physical therapist.  · Keep all follow-up visits as told by your doctor. This is important. These include any visits with a speech or occupational therapist.  · Think about joining a support group for people who have Parkinson disease.  Contact a doctor if:  · Medicines do not help your symptoms.  · You feel off-balance.  · You fall at home.  · You need more help at home.  · You have:  ¨ Trouble swallowing.  ¨ A very hard time pooping (constipation).  ¨ A lot of side effects from your medicines.  · You see or hear things that are not real (hallucinate).  · You feel:  ¨ Confused.  ¨ Anxious.  ¨ Sad (depressed).  Get help right away if:  · You were hurt in a fall.  · You cannot swallow without choking.  · You have chest pain.  · You have trouble breathing.  · You do not feel safe at home.  This information is not intended to replace advice given to you by your health care provider. Make sure you discuss any questions you have with your health care provider.  Document Released: 03/11/2013 Document Revised: 05/25/2017 Document Reviewed: 10/07/2016  © 2017 [employer]

## 2020-02-07 ENCOUNTER — APPOINTMENT (RX ONLY)
Dept: URBAN - METROPOLITAN AREA CLINIC 22 | Facility: CLINIC | Age: 70
Setting detail: DERMATOLOGY
End: 2020-02-07

## 2020-02-07 PROBLEM — C44.42 SQUAMOUS CELL CARCINOMA OF SKIN OF SCALP AND NECK: Status: ACTIVE | Noted: 2020-02-07

## 2020-02-07 PROCEDURE — ? DIAGNOSIS COMMENT

## 2020-02-07 PROCEDURE — ? CURETTAGE AND DESTRUCTION

## 2020-02-07 PROCEDURE — 17272 DSTR MAL LES S/N/H/F/G 1.1-2: CPT

## 2020-02-07 PROCEDURE — ? REFERRAL CORRESPONDENCE

## 2020-02-07 NOTE — PROCEDURE: CURETTAGE AND DESTRUCTION
Detail Level: Detailed
Size Of Lesion In Cm: 0.9
Size Of Lesion After Curettage: 1.5
Add Intralesional Injection: No
Concentration (Mg/Ml Or Millions Of Plaque Forming Units/Cc): 0.01
Total Volume (Ccs): 1
Anesthesia Type: 1% lidocaine with epinephrine and a 1:10 solution of 8.4% sodium bicarbonate
Anesthesia Volume In Cc: 3
Cautery Type: electrodesiccation
What Was Performed First?: Curettage
Additional Information: (Optional): The wound was cleaned, and a pressure dressing was applied.  The patient received detailed post-op instructions.
Consent was obtained from the patient. The risks, benefits and alternatives to therapy were discussed in detail. Specifically, the risks of infection, scarring, bleeding, prolonged wound healing, nerve injury, incomplete removal, allergy to anesthesia and recurrence were addressed. Alternatives to ED&C, such as: surgical removal and XRT were also discussed.  Prior to the procedure, the treatment site was clearly identified and confirmed by the patient. All components of Universal Protocol/PAUSE Rule completed.
Post-Care Instructions: I reviewed with the patient in detail post-care instructions. Patient is to keep the area dry for 48 hours, and not to engage in any swimming until the area is healed. Should the patient develop any fevers, chills, bleeding, severe pain patient will contact the office immediately.
Bill As A Line Item Or As Units: Line Item

## 2020-02-07 NOTE — HPI: PROCEDURE (MOHS)
Has The Growth Been Previously Biopsied?: has been previously biopsied
Body Location Override (Optional): Left side scalp

## 2020-02-07 NOTE — HPI: PROCEDURE (MOHS)
Has The Growth Been Previously Biopsied?: has been previously biopsied
Body Location Override (Optional): Left side of scalp above hairline

## 2020-02-07 NOTE — PROCEDURE: CURETTAGE AND DESTRUCTION
Detail Level: Detailed
Size Of Lesion In Cm: 1
Size Of Lesion After Curettage: 1.3
Add Intralesional Injection: No
Concentration (Mg/Ml Or Millions Of Plaque Forming Units/Cc): 0.01
Anesthesia Type: 1% lidocaine with epinephrine and a 1:10 solution of 8.4% sodium bicarbonate
Anesthesia Volume In Cc: 3
Cautery Type: electrodesiccation
What Was Performed First?: Curettage
Additional Information: (Optional): The wound was cleaned, and a pressure dressing was applied.  The patient received detailed post-op instructions.
Consent was obtained from the patient. The risks, benefits and alternatives to therapy were discussed in detail. Specifically, the risks of infection, scarring, bleeding, prolonged wound healing, nerve injury, incomplete removal, allergy to anesthesia and recurrence were addressed. Alternatives to ED&C, such as: surgical removal and XRT were also discussed.  Prior to the procedure, the treatment site was clearly identified and confirmed by the patient. All components of Universal Protocol/PAUSE Rule completed.
Post-Care Instructions: I reviewed with the patient in detail post-care instructions. Patient is to keep the area dry for 48 hours, and not to engage in any swimming until the area is healed. Should the patient develop any fevers, chills, bleeding, severe pain patient will contact the office immediately.
Bill As A Line Item Or As Units: Line Item

## 2020-03-10 NOTE — TELEPHONE ENCOUNTER
Received request via: Patient    Was the patient seen in the last year in this department? Yes    Does the patient have an active prescription (recently filled or refills available) for medication(s) requested? No     Would like 90 day supply

## 2020-06-01 ENCOUNTER — HOSPITAL ENCOUNTER (OUTPATIENT)
Dept: LAB | Facility: MEDICAL CENTER | Age: 70
End: 2020-06-01
Attending: UROLOGY
Payer: MEDICARE

## 2020-06-01 ENCOUNTER — OFFICE VISIT (OUTPATIENT)
Dept: ADMISSIONS | Facility: MEDICAL CENTER | Age: 70
DRG: 714 | End: 2020-06-01
Attending: UROLOGY
Payer: MEDICARE

## 2020-06-01 DIAGNOSIS — Z01.812 PRE-OPERATIVE LABORATORY EXAMINATION: ICD-10-CM

## 2020-06-01 LAB
ALBUMIN SERPL BCP-MCNC: 4 G/DL (ref 3.2–4.9)
ALBUMIN/GLOB SERPL: 1.7 G/DL
ALP SERPL-CCNC: 80 U/L (ref 30–99)
ALT SERPL-CCNC: 21 U/L (ref 2–50)
ANION GAP SERPL CALC-SCNC: 13 MMOL/L (ref 7–16)
AST SERPL-CCNC: 22 U/L (ref 12–45)
BILIRUB SERPL-MCNC: 0.3 MG/DL (ref 0.1–1.5)
BUN SERPL-MCNC: 23 MG/DL (ref 8–22)
CALCIUM SERPL-MCNC: 8.9 MG/DL (ref 8.5–10.5)
CHLORIDE SERPL-SCNC: 101 MMOL/L (ref 96–112)
CO2 SERPL-SCNC: 25 MMOL/L (ref 20–33)
COVID ORDER STATUS COVID19: NORMAL
CREAT SERPL-MCNC: 1.48 MG/DL (ref 0.5–1.4)
GLOBULIN SER CALC-MCNC: 2.3 G/DL (ref 1.9–3.5)
GLUCOSE SERPL-MCNC: 99 MG/DL (ref 65–99)
POTASSIUM SERPL-SCNC: 4 MMOL/L (ref 3.6–5.5)
PROT SERPL-MCNC: 6.3 G/DL (ref 6–8.2)
SODIUM SERPL-SCNC: 139 MMOL/L (ref 135–145)

## 2020-06-01 PROCEDURE — C9803 HOPD COVID-19 SPEC COLLECT: HCPCS

## 2020-06-01 PROCEDURE — 87186 SC STD MICRODIL/AGAR DIL: CPT

## 2020-06-01 PROCEDURE — 87077 CULTURE AEROBIC IDENTIFY: CPT

## 2020-06-01 PROCEDURE — 80053 COMPREHEN METABOLIC PANEL: CPT

## 2020-06-01 PROCEDURE — 87086 URINE CULTURE/COLONY COUNT: CPT

## 2020-06-02 ENCOUNTER — HOSPITAL ENCOUNTER (OUTPATIENT)
Dept: RADIOLOGY | Facility: MEDICAL CENTER | Age: 70
DRG: 714 | End: 2020-06-02
Attending: UROLOGY | Admitting: UROLOGY
Payer: MEDICARE

## 2020-06-02 VITALS — BODY MASS INDEX: 27.39 KG/M2 | WEIGHT: 213.41 LBS | HEIGHT: 74 IN

## 2020-06-02 DIAGNOSIS — N40.1 BENIGN LOCALIZED PROSTATIC HYPERPLASIA WITH LOWER URINARY TRACT SYMPTOMS (LUTS): ICD-10-CM

## 2020-06-02 DIAGNOSIS — Z01.812 PRE-OPERATIVE LABORATORY EXAMINATION: ICD-10-CM

## 2020-06-02 DIAGNOSIS — Z01.810 PRE-OPERATIVE CARDIOVASCULAR EXAMINATION: ICD-10-CM

## 2020-06-02 LAB
BASOPHILS # BLD AUTO: 0.9 % (ref 0–1.8)
BASOPHILS # BLD: 0.09 K/UL (ref 0–0.12)
EKG IMPRESSION: NORMAL
EOSINOPHIL # BLD AUTO: 0.18 K/UL (ref 0–0.51)
EOSINOPHIL NFR BLD: 1.8 % (ref 0–6.9)
ERYTHROCYTE [DISTWIDTH] IN BLOOD BY AUTOMATED COUNT: 48.5 FL (ref 35.9–50)
HCT VFR BLD AUTO: 47.1 % (ref 42–52)
HGB BLD-MCNC: 16.1 G/DL (ref 14–18)
LYMPHOCYTES # BLD AUTO: 5.65 K/UL (ref 1–4.8)
LYMPHOCYTES NFR BLD: 55.4 % (ref 22–41)
MACROCYTES BLD QL SMEAR: ABNORMAL
MANUAL DIFF BLD: NORMAL
MCH RBC QN AUTO: 34.5 PG (ref 27–33)
MCHC RBC AUTO-ENTMCNC: 34.2 G/DL (ref 33.7–35.3)
MCV RBC AUTO: 100.9 FL (ref 81.4–97.8)
MONOCYTES # BLD AUTO: 0.46 K/UL (ref 0–0.85)
MONOCYTES NFR BLD AUTO: 4.5 % (ref 0–13.4)
MORPHOLOGY BLD-IMP: NORMAL
NEUTROPHILS # BLD AUTO: 3.83 K/UL (ref 1.82–7.42)
NEUTROPHILS NFR BLD: 37.5 % (ref 44–72)
NRBC # BLD AUTO: 0 K/UL
NRBC BLD-RTO: 0 /100 WBC
PLATELET # BLD AUTO: 80 K/UL (ref 164–446)
PLATELET BLD QL SMEAR: NORMAL
PMV BLD AUTO: 11.9 FL (ref 9–12.9)
RBC # BLD AUTO: 4.67 M/UL (ref 4.7–6.1)
RBC BLD AUTO: PRESENT
SMUDGE CELLS BLD QL SMEAR: NORMAL
WBC # BLD AUTO: 10.2 K/UL (ref 4.8–10.8)

## 2020-06-02 PROCEDURE — 85007 BL SMEAR W/DIFF WBC COUNT: CPT

## 2020-06-02 PROCEDURE — 85027 COMPLETE CBC AUTOMATED: CPT

## 2020-06-02 PROCEDURE — 93005 ELECTROCARDIOGRAM TRACING: CPT

## 2020-06-02 PROCEDURE — 93010 ELECTROCARDIOGRAM REPORT: CPT | Performed by: INTERNAL MEDICINE

## 2020-06-02 PROCEDURE — 80500 HCHG CLINICAL PATH CONSULT-LIMITED: CPT

## 2020-06-02 PROCEDURE — 36415 COLL VENOUS BLD VENIPUNCTURE: CPT

## 2020-06-02 RX ORDER — LISINOPRIL AND HYDROCHLOROTHIAZIDE 20; 12.5 MG/1; MG/1
1 TABLET ORAL DAILY
COMMUNITY

## 2020-06-02 RX ORDER — DONEPEZIL HYDROCHLORIDE 5 MG/1
5 TABLET, FILM COATED ORAL NIGHTLY
COMMUNITY

## 2020-06-02 ASSESSMENT — FIBROSIS 4 INDEX: FIB4 SCORE: 5.02

## 2020-06-03 ENCOUNTER — HOSPITAL ENCOUNTER (OUTPATIENT)
Dept: RADIOLOGY | Facility: MEDICAL CENTER | Age: 70
DRG: 714 | End: 2020-06-03
Attending: UROLOGY
Payer: MEDICARE

## 2020-06-03 LAB
PATH REV: NORMAL
PATH REV: NORMAL

## 2020-06-03 PROCEDURE — 74018 RADEX ABDOMEN 1 VIEW: CPT

## 2020-06-03 NOTE — PROGRESS NOTES
Received direct admit transfer request from Dr. Cox.  Sending Physician: Dr. Cox  Diagnosis: BPH  Patient accepted by: Dr. Cox  Patient coming via: POV  ETA: 6/5 @ 0611

## 2020-06-04 LAB
BACTERIA UR CULT: ABNORMAL
BACTERIA UR CULT: ABNORMAL
SARS-COV-2 RNA RESP QL NAA+PROBE: NOT DETECTED
SIGNIFICANT IND 70042: ABNORMAL
SITE SITE: ABNORMAL
SOURCE SOURCE: ABNORMAL
SPECIMEN SOURCE: NORMAL

## 2020-06-04 NOTE — OR NURSING
Message left for Yared Goodman to call us back about covid screening questions for Kaushik Siegel.

## 2020-06-05 ENCOUNTER — ANESTHESIA (OUTPATIENT)
Dept: SURGERY | Facility: MEDICAL CENTER | Age: 70
DRG: 714 | End: 2020-06-05
Payer: MEDICARE

## 2020-06-05 ENCOUNTER — HOSPITAL ENCOUNTER (OUTPATIENT)
Facility: MEDICAL CENTER | Age: 70
DRG: 714 | End: 2020-06-05
Admitting: UROLOGY
Payer: MEDICARE

## 2020-06-05 ENCOUNTER — HOSPITAL ENCOUNTER (INPATIENT)
Facility: MEDICAL CENTER | Age: 70
LOS: 3 days | DRG: 714 | End: 2020-06-08
Attending: UROLOGY | Admitting: UROLOGY
Payer: MEDICARE

## 2020-06-05 ENCOUNTER — ANESTHESIA EVENT (OUTPATIENT)
Dept: SURGERY | Facility: MEDICAL CENTER | Age: 70
DRG: 714 | End: 2020-06-05
Payer: MEDICARE

## 2020-06-05 ENCOUNTER — HOSPITAL ENCOUNTER (OUTPATIENT)
Facility: MEDICAL CENTER | Age: 70
DRG: 714 | End: 2020-06-05
Attending: UROLOGY | Admitting: UROLOGY
Payer: MEDICARE

## 2020-06-05 LAB — PATHOLOGY CONSULT NOTE: NORMAL

## 2020-06-05 PROCEDURE — 700111 HCHG RX REV CODE 636 W/ 250 OVERRIDE (IP): Performed by: UROLOGY

## 2020-06-05 PROCEDURE — 0VB08ZZ EXCISION OF PROSTATE, VIA NATURAL OR ARTIFICIAL OPENING ENDOSCOPIC: ICD-10-PCS | Performed by: UROLOGY

## 2020-06-05 PROCEDURE — 88342 IMHCHEM/IMCYTCHM 1ST ANTB: CPT

## 2020-06-05 PROCEDURE — 88341 IMHCHEM/IMCYTCHM EA ADD ANTB: CPT

## 2020-06-05 PROCEDURE — A9270 NON-COVERED ITEM OR SERVICE: HCPCS | Performed by: UROLOGY

## 2020-06-05 PROCEDURE — 160009 HCHG ANES TIME/MIN: Performed by: UROLOGY

## 2020-06-05 PROCEDURE — 160048 HCHG OR STATISTICAL LEVEL 1-5: Performed by: UROLOGY

## 2020-06-05 PROCEDURE — 160028 HCHG SURGERY MINUTES - 1ST 30 MINS LEVEL 3: Performed by: UROLOGY

## 2020-06-05 PROCEDURE — 700105 HCHG RX REV CODE 258

## 2020-06-05 PROCEDURE — 700102 HCHG RX REV CODE 250 W/ 637 OVERRIDE(OP): Performed by: PHYSICIAN ASSISTANT

## 2020-06-05 PROCEDURE — 88305 TISSUE EXAM BY PATHOLOGIST: CPT

## 2020-06-05 PROCEDURE — 160035 HCHG PACU - 1ST 60 MINS PHASE I: Performed by: UROLOGY

## 2020-06-05 PROCEDURE — 160039 HCHG SURGERY MINUTES - EA ADDL 1 MIN LEVEL 3: Performed by: UROLOGY

## 2020-06-05 PROCEDURE — 160036 HCHG PACU - EA ADDL 30 MINS PHASE I: Performed by: UROLOGY

## 2020-06-05 PROCEDURE — C1769 GUIDE WIRE: HCPCS | Performed by: UROLOGY

## 2020-06-05 PROCEDURE — 770001 HCHG ROOM/CARE - MED/SURG/GYN PRIV*

## 2020-06-05 PROCEDURE — 700111 HCHG RX REV CODE 636 W/ 250 OVERRIDE (IP): Performed by: ANESTHESIOLOGY

## 2020-06-05 PROCEDURE — 501138 HCHG PLUG, FOLEY CATH: Performed by: UROLOGY

## 2020-06-05 PROCEDURE — 700101 HCHG RX REV CODE 250: Performed by: ANESTHESIOLOGY

## 2020-06-05 PROCEDURE — 501329 HCHG SET, CYSTO IRRIG Y TUR: Performed by: UROLOGY

## 2020-06-05 PROCEDURE — 160002 HCHG RECOVERY MINUTES (STAT): Performed by: UROLOGY

## 2020-06-05 PROCEDURE — A9270 NON-COVERED ITEM OR SERVICE: HCPCS | Performed by: PHYSICIAN ASSISTANT

## 2020-06-05 PROCEDURE — 700102 HCHG RX REV CODE 250 W/ 637 OVERRIDE(OP): Performed by: UROLOGY

## 2020-06-05 PROCEDURE — 500042 HCHG BAG, URINARY DRAINAGE (CLOSED): Performed by: UROLOGY

## 2020-06-05 PROCEDURE — 500879 HCHG PACK, CYSTO: Performed by: UROLOGY

## 2020-06-05 PROCEDURE — 700105 HCHG RX REV CODE 258: Performed by: ANESTHESIOLOGY

## 2020-06-05 PROCEDURE — A4346 CATH INDW FOLEY 3 WAY: HCPCS | Performed by: UROLOGY

## 2020-06-05 RX ORDER — HALOPERIDOL 5 MG/ML
1 INJECTION INTRAMUSCULAR
Status: DISCONTINUED | OUTPATIENT
Start: 2020-06-05 | End: 2020-06-05 | Stop reason: HOSPADM

## 2020-06-05 RX ORDER — HYDROCODONE BITARTRATE AND ACETAMINOPHEN 5; 325 MG/1; MG/1
1-2 TABLET ORAL EVERY 6 HOURS PRN
Status: DISCONTINUED | OUTPATIENT
Start: 2020-06-05 | End: 2020-06-08 | Stop reason: HOSPADM

## 2020-06-05 RX ORDER — GLYCOPYRROLATE 0.2 MG/ML
INJECTION INTRAMUSCULAR; INTRAVENOUS PRN
Status: DISCONTINUED | OUTPATIENT
Start: 2020-06-05 | End: 2020-06-05 | Stop reason: SURG

## 2020-06-05 RX ORDER — METOPROLOL SUCCINATE 25 MG/1
25 TABLET, EXTENDED RELEASE ORAL DAILY
Status: DISCONTINUED | OUTPATIENT
Start: 2020-06-06 | End: 2020-06-08 | Stop reason: HOSPADM

## 2020-06-05 RX ORDER — LISINOPRIL 20 MG/1
20 TABLET ORAL
Status: DISCONTINUED | OUTPATIENT
Start: 2020-06-06 | End: 2020-06-08 | Stop reason: HOSPADM

## 2020-06-05 RX ORDER — CEFAZOLIN SODIUM 1 G/3ML
INJECTION, POWDER, FOR SOLUTION INTRAMUSCULAR; INTRAVENOUS PRN
Status: DISCONTINUED | OUTPATIENT
Start: 2020-06-05 | End: 2020-06-05 | Stop reason: SURG

## 2020-06-05 RX ORDER — HYDRALAZINE HYDROCHLORIDE 20 MG/ML
5 INJECTION INTRAMUSCULAR; INTRAVENOUS
Status: DISCONTINUED | OUTPATIENT
Start: 2020-06-05 | End: 2020-06-05 | Stop reason: HOSPADM

## 2020-06-05 RX ORDER — BENZTROPINE MESYLATE 1 MG/1
1 TABLET ORAL PRN
Status: DISCONTINUED | OUTPATIENT
Start: 2020-06-05 | End: 2020-06-08 | Stop reason: HOSPADM

## 2020-06-05 RX ORDER — CEFAZOLIN SODIUM 1 G/3ML
1 INJECTION, POWDER, FOR SOLUTION INTRAMUSCULAR; INTRAVENOUS EVERY 8 HOURS
Status: DISCONTINUED | OUTPATIENT
Start: 2020-06-05 | End: 2020-06-06

## 2020-06-05 RX ORDER — SODIUM CHLORIDE 9 MG/ML
INJECTION, SOLUTION INTRAVENOUS
Status: COMPLETED
Start: 2020-06-05 | End: 2020-06-05

## 2020-06-05 RX ORDER — SODIUM CHLORIDE, SODIUM LACTATE, POTASSIUM CHLORIDE, CALCIUM CHLORIDE 600; 310; 30; 20 MG/100ML; MG/100ML; MG/100ML; MG/100ML
INJECTION, SOLUTION INTRAVENOUS CONTINUOUS
Status: DISCONTINUED | OUTPATIENT
Start: 2020-06-05 | End: 2020-06-05 | Stop reason: HOSPADM

## 2020-06-05 RX ORDER — ATORVASTATIN CALCIUM 20 MG/1
20 TABLET, FILM COATED ORAL NIGHTLY
Status: DISCONTINUED | OUTPATIENT
Start: 2020-06-05 | End: 2020-06-08 | Stop reason: HOSPADM

## 2020-06-05 RX ORDER — SODIUM CHLORIDE, SODIUM LACTATE, POTASSIUM CHLORIDE, CALCIUM CHLORIDE 600; 310; 30; 20 MG/100ML; MG/100ML; MG/100ML; MG/100ML
INJECTION, SOLUTION INTRAVENOUS
Status: DISCONTINUED | OUTPATIENT
Start: 2020-06-05 | End: 2020-06-05 | Stop reason: SURG

## 2020-06-05 RX ORDER — ATROPA BELLADONNA AND OPIUM 16.2; 3 MG/1; MG/1
SUPPOSITORY RECTAL
Status: DISCONTINUED | OUTPATIENT
Start: 2020-06-05 | End: 2020-06-05 | Stop reason: HOSPADM

## 2020-06-05 RX ORDER — ONDANSETRON 2 MG/ML
4 INJECTION INTRAMUSCULAR; INTRAVENOUS EVERY 6 HOURS PRN
Status: DISCONTINUED | OUTPATIENT
Start: 2020-06-05 | End: 2020-06-08 | Stop reason: HOSPADM

## 2020-06-05 RX ORDER — HYDROCHLOROTHIAZIDE 12.5 MG/1
12.5 TABLET ORAL
Status: DISCONTINUED | OUTPATIENT
Start: 2020-06-06 | End: 2020-06-08 | Stop reason: HOSPADM

## 2020-06-05 RX ORDER — DEXAMETHASONE SODIUM PHOSPHATE 4 MG/ML
INJECTION, SOLUTION INTRA-ARTICULAR; INTRALESIONAL; INTRAMUSCULAR; INTRAVENOUS; SOFT TISSUE PRN
Status: DISCONTINUED | OUTPATIENT
Start: 2020-06-05 | End: 2020-06-05 | Stop reason: SURG

## 2020-06-05 RX ORDER — FLUPHENAZINE HYDROCHLORIDE 1 MG/1
2.5 TABLET ORAL DAILY
Status: DISCONTINUED | OUTPATIENT
Start: 2020-06-06 | End: 2020-06-08 | Stop reason: HOSPADM

## 2020-06-05 RX ORDER — CITALOPRAM 20 MG/1
20 TABLET ORAL DAILY
Status: DISCONTINUED | OUTPATIENT
Start: 2020-06-06 | End: 2020-06-08 | Stop reason: HOSPADM

## 2020-06-05 RX ORDER — TAMSULOSIN HYDROCHLORIDE 0.4 MG/1
0.4 CAPSULE ORAL
Status: DISCONTINUED | OUTPATIENT
Start: 2020-06-06 | End: 2020-06-08 | Stop reason: HOSPADM

## 2020-06-05 RX ORDER — ONDANSETRON 2 MG/ML
INJECTION INTRAMUSCULAR; INTRAVENOUS PRN
Status: DISCONTINUED | OUTPATIENT
Start: 2020-06-05 | End: 2020-06-05 | Stop reason: SURG

## 2020-06-05 RX ORDER — DONEPEZIL HYDROCHLORIDE 5 MG/1
5 TABLET, FILM COATED ORAL NIGHTLY
Status: DISCONTINUED | OUTPATIENT
Start: 2020-06-05 | End: 2020-06-08 | Stop reason: HOSPADM

## 2020-06-05 RX ORDER — ONDANSETRON 2 MG/ML
4 INJECTION INTRAMUSCULAR; INTRAVENOUS
Status: DISCONTINUED | OUTPATIENT
Start: 2020-06-05 | End: 2020-06-05 | Stop reason: HOSPADM

## 2020-06-05 RX ORDER — LISINOPRIL AND HYDROCHLOROTHIAZIDE 20; 12.5 MG/1; MG/1
1 TABLET ORAL DAILY
Status: DISCONTINUED | OUTPATIENT
Start: 2020-06-06 | End: 2020-06-05

## 2020-06-05 RX ORDER — TRAZODONE HYDROCHLORIDE 150 MG/1
150 TABLET ORAL NIGHTLY
Status: DISCONTINUED | OUTPATIENT
Start: 2020-06-05 | End: 2020-06-08 | Stop reason: HOSPADM

## 2020-06-05 RX ADMIN — ONDANSETRON 4 MG: 2 INJECTION INTRAMUSCULAR; INTRAVENOUS at 15:50

## 2020-06-05 RX ADMIN — ATORVASTATIN CALCIUM 20 MG: 20 TABLET, FILM COATED ORAL at 22:51

## 2020-06-05 RX ADMIN — EPHEDRINE SULFATE 10 MG: 50 INJECTION, SOLUTION INTRAVENOUS at 15:04

## 2020-06-05 RX ADMIN — DONEPEZIL HYDROCHLORIDE 5 MG: 5 TABLET, FILM COATED ORAL at 22:51

## 2020-06-05 RX ADMIN — ATROPINE SULFATE 0.2 MG: 0.4 INJECTION, SOLUTION INTRAMUSCULAR; INTRAVENOUS; SUBCUTANEOUS at 15:16

## 2020-06-05 RX ADMIN — TRAZODONE HYDROCHLORIDE 150 MG: 150 TABLET ORAL at 22:51

## 2020-06-05 RX ADMIN — GLYCOPYRROLATE 0.2 MG: 0.2 INJECTION INTRAMUSCULAR; INTRAVENOUS at 15:13

## 2020-06-05 RX ADMIN — EPHEDRINE SULFATE 10 MG: 50 INJECTION, SOLUTION INTRAVENOUS at 15:12

## 2020-06-05 RX ADMIN — SODIUM CHLORIDE, POTASSIUM CHLORIDE, SODIUM LACTATE AND CALCIUM CHLORIDE: 600; 310; 30; 20 INJECTION, SOLUTION INTRAVENOUS at 14:47

## 2020-06-05 RX ADMIN — GLYCOPYRROLATE 0.2 MG: 0.2 INJECTION INTRAMUSCULAR; INTRAVENOUS at 15:11

## 2020-06-05 RX ADMIN — CEFAZOLIN 2 G: 330 INJECTION, POWDER, FOR SOLUTION INTRAMUSCULAR; INTRAVENOUS at 15:00

## 2020-06-05 RX ADMIN — EPHEDRINE SULFATE 10 MG: 50 INJECTION, SOLUTION INTRAVENOUS at 15:08

## 2020-06-05 RX ADMIN — FENTANYL CITRATE 100 MCG: 50 INJECTION, SOLUTION INTRAMUSCULAR; INTRAVENOUS at 14:55

## 2020-06-05 RX ADMIN — PROPOFOL 200 MG: 10 INJECTION, EMULSION INTRAVENOUS at 14:55

## 2020-06-05 RX ADMIN — CARBIDOPA AND LEVODOPA 1 TABLET: 10; 100 TABLET ORAL at 18:18

## 2020-06-05 RX ADMIN — CEFAZOLIN 1 G: 330 INJECTION, POWDER, FOR SOLUTION INTRAMUSCULAR; INTRAVENOUS at 18:30

## 2020-06-05 RX ADMIN — FENTANYL CITRATE 50 MCG: 50 INJECTION, SOLUTION INTRAMUSCULAR; INTRAVENOUS at 15:25

## 2020-06-05 RX ADMIN — HYDROCODONE BITARTRATE AND ACETAMINOPHEN 2 TABLET: 5; 325 TABLET ORAL at 22:51

## 2020-06-05 RX ADMIN — PROPOFOL 100 MG: 10 INJECTION, EMULSION INTRAVENOUS at 14:57

## 2020-06-05 RX ADMIN — DEXAMETHASONE SODIUM PHOSPHATE 4 MG: 4 INJECTION, SOLUTION INTRA-ARTICULAR; INTRALESIONAL; INTRAMUSCULAR; INTRAVENOUS; SOFT TISSUE at 15:00

## 2020-06-05 RX ADMIN — FENTANYL CITRATE 50 MCG: 50 INJECTION, SOLUTION INTRAMUSCULAR; INTRAVENOUS at 15:02

## 2020-06-05 RX ADMIN — FENTANYL CITRATE 50 MCG: 50 INJECTION, SOLUTION INTRAMUSCULAR; INTRAVENOUS at 15:32

## 2020-06-05 RX ADMIN — SODIUM CHLORIDE 500 ML: 9 INJECTION, SOLUTION INTRAVENOUS at 18:20

## 2020-06-05 ASSESSMENT — LIFESTYLE VARIABLES
DOES PATIENT WANT TO STOP DRINKING: NO
EVER_SMOKED: YES
HOW MANY TIMES IN THE PAST YEAR HAVE YOU HAD 5 OR MORE DRINKS IN A DAY: 0
TOTAL SCORE: 0
EVER FELT BAD OR GUILTY ABOUT YOUR DRINKING: NO
TOTAL SCORE: 0
CONSUMPTION TOTAL: NEGATIVE
EVER HAD A DRINK FIRST THING IN THE MORNING TO STEADY YOUR NERVES TO GET RID OF A HANGOVER: NO
ALCOHOL_USE: NO
HAVE YOU EVER FELT YOU SHOULD CUT DOWN ON YOUR DRINKING: NO
ON A TYPICAL DAY WHEN YOU DRINK ALCOHOL HOW MANY DRINKS DO YOU HAVE: 0
AVERAGE NUMBER OF DAYS PER WEEK YOU HAVE A DRINK CONTAINING ALCOHOL: 0
TOTAL SCORE: 0
HAVE PEOPLE ANNOYED YOU BY CRITICIZING YOUR DRINKING: NO

## 2020-06-05 ASSESSMENT — COGNITIVE AND FUNCTIONAL STATUS - GENERAL
SUGGESTED CMS G CODE MODIFIER MOBILITY: CH
MOBILITY SCORE: 24
DAILY ACTIVITIY SCORE: 24
SUGGESTED CMS G CODE MODIFIER DAILY ACTIVITY: CH

## 2020-06-05 ASSESSMENT — PATIENT HEALTH QUESTIONNAIRE - PHQ9
SUM OF ALL RESPONSES TO PHQ9 QUESTIONS 1 AND 2: 0
1. LITTLE INTEREST OR PLEASURE IN DOING THINGS: NOT AT ALL
2. FEELING DOWN, DEPRESSED, IRRITABLE, OR HOPELESS: NOT AT ALL

## 2020-06-05 ASSESSMENT — FIBROSIS 4 INDEX: FIB4 SCORE: 4.14

## 2020-06-05 NOTE — ANESTHESIA TIME REPORT
Anesthesia Start and Stop Event Times     Date Time Event    6/5/2020 1439 Ready for Procedure     1447 Anesthesia Start     1600 Anesthesia Stop        Responsible Staff  06/05/20    Name Role Begin End    Eleonora Barajas M.D. Anesth 1447 1600        Preop Diagnosis (Free Text):  Pre-op Diagnosis     BENIGN PROSTATIC HYPERPLASIA        Preop Diagnosis (Codes):    Post op Diagnosis  BPH with obstruction/lower urinary tract symptoms      Premium Reason  A. 3PM - 7AM    Comments:

## 2020-06-05 NOTE — OR SURGEON
Immediate Post OP Note    PreOp Diagnosis: BPH with Severe LUTS    PostOp Diagnosis: As above    Procedure(s):CYSTOSCOPY- RIGID - Wound Class: Clean Contaminated  BIPOLAR TURP- Wound Class: Clean Contaminated      Surgeon(s):  Reji Cox M.D.    Anesthesiologist/Type of Anesthesia:  Anesthesiologist: Eleonora Barajas M.D./General    Surgical Staff:  Circulator: Nancy Freeman R.N.  Scrub Person: Julio Jenkins    Specimens removed if any:  ID Type Source Tests Collected by Time Destination   A : PROSTATE CHIPS Tissue Prostate PATHOLOGY SPECIMEN Reji Cox M.D. 6/5/2020 1527        Estimated Blood Loss: 50ml    Findings: Bilobar hypertrophy of the prostate    Complications: None  Drains: 22 [place] to gravity        6/5/2020 3:50 PM Reji Cox M.D.

## 2020-06-05 NOTE — ANESTHESIA PROCEDURE NOTES
Airway    Date/Time: 6/5/2020 2:56 PM  Performed by: Eleonora Barajas M.D.  Authorized by: Eleonora Barajas M.D.     Location:  OR  Urgency:  Elective  Indications for Airway Management:  Anesthesia      Spontaneous Ventilation: absent    Sedation Level:  Deep  Preoxygenated: Yes    Mask Difficulty Assessment:  0 - not attempted  Final Airway Type:  Supraglottic airway  Final Supraglottic Airway:  Standard LMA    SGA Size:  5  Number of Attempts at Approach:  1

## 2020-06-05 NOTE — ANESTHESIA PREPROCEDURE EVALUATION
68 y/o male with h/o schizophrenia, BPH and urinary retention with renal insufficiency (creatanine 1.48), thrombocytopenia (last platelet count 80K 2 days ago), presenting for TURP. Will be admitted post op as he's in a group home. He's a long time smoker, not smoked x 2 days.    Relevant Problems      (+) Renal failure       Physical Exam    Airway   Mallampati: II  TM distance: >3 FB  Neck ROM: full       Cardiovascular - normal exam  Rhythm: regular  Rate: normal  (-) murmur     Dental       Very poor dentition  Facial Hair   Pulmonary - normal exam  Breath sounds clear to auscultation     Abdominal    Neurological - normal exam         Other findings: Multiple missing and rotten/broken teeth          Anesthesia Plan    ASA 2       Plan - general       Airway plan will be LMA      Plan Factors:   Patient was previously instructed to abstain from smoking on day of procedure.  Patient did not smoke on day of procedure.      Induction: intravenous      Pertinent diagnostic labs and testing reviewed    Informed Consent:    Anesthetic plan and risks discussed with patient.

## 2020-06-06 PROBLEM — N40.0 BENIGN PROSTATIC HYPERPLASIA: Status: ACTIVE | Noted: 2020-06-06

## 2020-06-06 PROCEDURE — 770001 HCHG ROOM/CARE - MED/SURG/GYN PRIV*

## 2020-06-06 PROCEDURE — 700102 HCHG RX REV CODE 250 W/ 637 OVERRIDE(OP): Performed by: UROLOGY

## 2020-06-06 PROCEDURE — 700111 HCHG RX REV CODE 636 W/ 250 OVERRIDE (IP): Performed by: UROLOGY

## 2020-06-06 PROCEDURE — A9270 NON-COVERED ITEM OR SERVICE: HCPCS | Performed by: UROLOGY

## 2020-06-06 PROCEDURE — A9270 NON-COVERED ITEM OR SERVICE: HCPCS | Performed by: PHYSICIAN ASSISTANT

## 2020-06-06 PROCEDURE — 700102 HCHG RX REV CODE 250 W/ 637 OVERRIDE(OP): Performed by: PHYSICIAN ASSISTANT

## 2020-06-06 RX ORDER — CEFAZOLIN SODIUM 1 G/50ML
1 INJECTION, SOLUTION INTRAVENOUS ONCE
Status: COMPLETED | OUTPATIENT
Start: 2020-06-06 | End: 2020-06-06

## 2020-06-06 RX ORDER — ONDANSETRON 4 MG/1
4 TABLET, FILM COATED ORAL EVERY 4 HOURS PRN
Qty: 20 TAB | Refills: 2 | Status: SHIPPED | OUTPATIENT
Start: 2020-06-06

## 2020-06-06 RX ORDER — NICOTINE 21 MG/24HR
14 PATCH, TRANSDERMAL 24 HOURS TRANSDERMAL
Status: DISCONTINUED | OUTPATIENT
Start: 2020-06-06 | End: 2020-06-08 | Stop reason: HOSPADM

## 2020-06-06 RX ADMIN — TRAZODONE HYDROCHLORIDE 150 MG: 150 TABLET ORAL at 22:10

## 2020-06-06 RX ADMIN — CITALOPRAM HYDROBROMIDE 20 MG: 20 TABLET ORAL at 07:23

## 2020-06-06 RX ADMIN — CARBIDOPA AND LEVODOPA 1 TABLET: 10; 100 TABLET ORAL at 16:58

## 2020-06-06 RX ADMIN — NICOTINE 14 MG: 14 PATCH TRANSDERMAL at 12:21

## 2020-06-06 RX ADMIN — CEFAZOLIN SODIUM 1 G: 1 INJECTION, SOLUTION INTRAVENOUS at 07:22

## 2020-06-06 RX ADMIN — METOPROLOL SUCCINATE 25 MG: 25 TABLET, EXTENDED RELEASE ORAL at 07:23

## 2020-06-06 RX ADMIN — TAMSULOSIN HYDROCHLORIDE 0.4 MG: 0.4 CAPSULE ORAL at 07:23

## 2020-06-06 RX ADMIN — CARBIDOPA AND LEVODOPA 1 TABLET: 10; 100 TABLET ORAL at 12:21

## 2020-06-06 RX ADMIN — DONEPEZIL HYDROCHLORIDE 5 MG: 5 TABLET, FILM COATED ORAL at 22:10

## 2020-06-06 RX ADMIN — ATORVASTATIN CALCIUM 20 MG: 20 TABLET, FILM COATED ORAL at 22:10

## 2020-06-06 RX ADMIN — HYDROCHLOROTHIAZIDE 12.5 MG: 12.5 TABLET ORAL at 07:23

## 2020-06-06 RX ADMIN — LISINOPRIL 20 MG: 20 TABLET ORAL at 07:23

## 2020-06-06 RX ADMIN — FLUPHENAZINE HYDROCHLORIDE 2.5 MG: 1 TABLET, FILM COATED ORAL at 07:23

## 2020-06-06 RX ADMIN — HYDROCODONE BITARTRATE AND ACETAMINOPHEN 1 TABLET: 5; 325 TABLET ORAL at 22:09

## 2020-06-06 RX ADMIN — CARBIDOPA AND LEVODOPA 1 TABLET: 10; 100 TABLET ORAL at 07:23

## 2020-06-06 NOTE — PROGRESS NOTES
Assumed care of pt at 1900, report given.  A+O x 4, VSS, and RA.   Pt has lopez catheter in place secured with STAT lock. Pt needing continuous re-education on indication.  Pt tolerating a regular diet; denies N/V at this time.   + urine output (pink-tinged bloody with no visible clots)  + BM (PTA)  Pt ambulating with a standby assist up to restroom; tolerating well.  Pt updated on POC and all questions answered at this time.  Bed in lowest position, call light within reach, and no current needs.     Pt awaiting to discharge to group home; pending lopez placement.

## 2020-06-06 NOTE — ANESTHESIA QCDR
2019 [facility] Clinical Data Registry (for Quality Improvement)     Postoperative nausea/vomiting risk protocol (Adult = 18 yrs and Pediatric 3-17 yrs)- (430 and 463)  General inhalation anesthetic (NOT TIVA) with PONV risk factors: No  Provision of anti-emetic therapy with at least 2 different classes of agents: N/A  Patient DID NOT receive anti-emetic therapy and reason is documented in Medical Record: N/A    Multimodal Pain Management- (477)  Non-emergent surgery AND patient age >= 18: Yes  Use of Multimodal Pain Management, two or more drugs and/or interventions, NOT including systemic opioids: Yes  Exception: Documented allergy to multiple classes of analgesics: N/A    Smoking Abstinence (404)  Patient is current smoker (cigarette, pipe, e-cig, marijuanna): Yes  Elective Surgery: Yes  Abstinence instructions provided prior to day of surgery: Yes  Patient abstained from smoking on day of surgery: Yes    Pre-Op Beta-Blocker in Isolated CABG (44)  Isolated CABG AND patient age >= 18: No  Beta-blocker admin within 24 hours of surgical incision:   Exception:of medical reason(s) for not administering beta blocker within 24 hours prior to surgical incision (e.g., not  indicated,other medical reason):     PACU assessment of acute postoperative pain prior to Anesthesia Care End- Applies to Patients Age = 18- (ABG7)  Initial PACU pain score is which of the following: < 7/10  Patient unable to report pain score: N/A    Post-anesthetic transfer of care checklist/protocol to PACU/ICU- (426 and 427)  Upon conclusion of case, patient transferred to which of the following locations: PACU/Non-ICU  Use of transfer checklist/protocol: Yes  Exclusion: Service Performed in Patient Hospital Room (and thus did not require transfer): N/A  Unplanned admission to ICU related to anesthesia service up through end of PACU care- (MD51)  Unplanned admission to ICU (not initially anticipated at anesthesia start time): No

## 2020-06-06 NOTE — PROGRESS NOTES
A&Ox4. Requires reorientation, forgetful at times. Appropriate behavior.   VSS.  Pt denies pain.   Saturating >90% on RA. Denies SOB.  Abdomen soft, + bowel sounds, + flatus, LBM 6/4 (PTA). Tolerating regular diet, denies n/v.   Velasco in place, pink tinged urine.   Pt ambulates SBA. Low fall risk. Fall precautions in place. Bed alarm on.   Updated on plan of care. Safety education provided. Bed locked in low. Call light within reach. Hourly rounding in place.

## 2020-06-06 NOTE — CARE PLAN
Problem: Communication  Goal: The ability to communicate needs accurately and effectively will improve  Outcome: PROGRESSING AS EXPECTED  Intervention: Gorin patient and significant other/support system to call light to alert staff of needs  Note: Education provided on use of call light. Pt demonstrates understanding by using call light appropriately.      Problem: Safety  Goal: Will remain free from falls  Outcome: PROGRESSING AS EXPECTED  Intervention: Assess risk factors for falls  Note: Call light within reach, bed locked in low position, personal belongings within reach, treaded socks on, all needs met at this time. Bed alarm on due to pt requiring reorientation at times and impulsive.

## 2020-06-06 NOTE — OR NURSING
Pt recovered well. Denied pain or nausea. Tolerated multiple glasses of water. A&Ox4 -- sometimes forgets where he is/what happened. Was able to follow commands with no issues. DALJIT Velasco patent, stat locked. Pt had no belongings in PACU

## 2020-06-06 NOTE — CARE PLAN
Problem: Communication  Goal: The ability to communicate needs accurately and effectively will improve  Outcome: PROGRESSING AS EXPECTED    Pt updated on POC and all questions answered at this time. Hourly rounding in place.     Problem: Safety  Goal: Will remain free from injury  Outcome: PROGRESSING AS EXPECTED    Proper fall precautions in place. Call light within reach and encouraged to use. Hourly rounding in practice.

## 2020-06-06 NOTE — DISCHARGE PLANNING
Anticipated Discharge Disposition: discharge to group home on Monday 6/8    Action: Spoke with  Bradley and  Nedra regarding patient's discharge; Pt is medically clear. [facility] cannot accept him back until Monday due to staffing.     Barriers to Discharge: staffing at group home    Plan: discharge Monday to group home.      SEE OP NOTE

## 2020-06-06 NOTE — PROGRESS NOTES
2 RN skin check complete.  All skin intact.  Velasco catheter in place post procedure draining clear red tinged urine.

## 2020-06-06 NOTE — DISCHARGE SUMMARY
Discharge Summary    CHIEF COMPLAINT ON ADMISSION  No chief complaint on file.      Reason for Admission  BPH     Admission Date  6/5/2020    CODE STATUS  Prior    HPI & HOSPITAL COURSE  This is a 69 y.o. male with hx of thrombocytopenia here with urinary incontinence and BPH s/p bipolar TURP with Dr. Cox 6/5. He had no overnight events of uncontrollable pain, nausea or vomiting. Velasco draining light pink, concentrated urine.         Therefore, he is discharged in fair and stable condition to skilled nursing facility.    The patient recovered much more quickly than anticipated on admission.    Discharge Date  6/6/20    FOLLOW UP ITEMS POST DISCHARGE  Follow up with Dr. Cox at [facility].    DISCHARGE DIAGNOSES  Active Problems:    * No active hospital problems. *  Resolved Problems:    * No resolved hospital problems. *      FOLLOW UP  No future appointments.  No follow-up provider specified.    MEDICATIONS ON DISCHARGE     Medication List      ASK your doctor about these medications      Instructions   atorvastatin 20 MG Tabs  Commonly known as:  LIPITOR   Take 20 mg by mouth every evening.  Dose:  20 mg     benztropine 1 MG Tabs  Commonly known as:  COGENTIN   Take 1 mg by mouth as needed.  Dose:  1 mg     carbidopa-levodopa  MG Tabs  Commonly known as:  SINEMET   Doctor's comments:  Please write prescriptions for this patient as a 31-day supply (we send our medications bubble packed according to calendar days on a cycle fill) with multiple refills.  TAKE 1 TABLET BY MOUTH THREE TIMES DAILY     citalopram 20 MG Tabs  Commonly known as:  CELEXA   Take 20 mg by mouth every day.  Dose:  20 mg     donepezil 5 MG Tabs  Commonly known as:  ARICEPT   Take 5 mg by mouth every evening.  Dose:  5 mg     fluphenazine 2.5 MG Tabs  Commonly known as:  PROLIXIN   Take 2.5 mg by mouth every day.  Dose:  2.5 mg     lisinopril-hydrochlorothiazide 20-12.5 MG per tablet  Commonly known as:  PRINZIDE   Take 1 Tab by mouth  every day.  Dose:  1 Tab     metoprolol SR 25 MG Tb24  Commonly known as:  TOPROL XL   Take 25 mg by mouth every day.  Dose:  25 mg     tamsulosin 0.4 MG capsule  Commonly known as:  FLOMAX   Take 0.4 mg by mouth ONE-HALF HOUR AFTER BREAKFAST.  Dose:  0.4 mg     traZODone 150 MG Tabs  Commonly known as:  DESYREL   Take 150 mg by mouth every evening.  Dose:  150 mg            Allergies  No Known Allergies    DIET  Orders Placed This Encounter   Procedures   • Diet Order Regular     Standing Status:   Standing     Number of Occurrences:   1     Order Specific Question:   Diet:     Answer:   Regular [1]       ACTIVITY  As tolerated.  Weight bearing as tolerated    CONSULTATIONS  None    PROCEDURES  Bipolar TURP    LABORATORY  Lab Results   Component Value Date    SODIUM 139 06/01/2020    POTASSIUM 4.0 06/01/2020    CHLORIDE 101 06/01/2020    CO2 25 06/01/2020    GLUCOSE 99 06/01/2020    BUN 23 (H) 06/01/2020    CREATININE 1.48 (H) 06/01/2020        Lab Results   Component Value Date    WBC 10.2 06/02/2020    HEMOGLOBIN 16.1 06/02/2020    HEMATOCRIT 47.1 06/02/2020    PLATELETCT 80 (L) 06/02/2020        Total time of the discharge process exceeds 35 minutes.

## 2020-06-06 NOTE — OP REPORT
DATE OF SERVICE:  06/05/2020    PREOPERATIVE DIAGNOSES:    1.  Urinary incontinence.    2.  Benign prostatic hypertrophy with severe lower urinary tract symptoms.    3.  Acute renal insufficiency.      OPERATIONS/PROCEDURES PERFORMED:    1.  Rigid cystourethroscopy.    2.  Bipolar transurethral resection of the prostate.      SURGEON:  Reji Cox MD     ANESTHESIA:  General laryngeal mask.      ANESTHESIOLOGIST:  Eleonora Barajas MD     POSTOPERATIVE DIAGNOSES:    1.  Urinary incontinence.    2.  Benign prostatic hypertrophy with severe lower urinary tract symptoms.    3.  Acute renal insufficiency.      COMPLICATIONS:  None.      DRAINS:  A 22-Arabic 3-way Velasco with catheter plug and inflow port to   gravity.      SPECIMENS:  Prostatic chips to pathology for permanent section.      INDICATIONS:  The patient is a 69-year-old gentleman with a history of   schizophrenia who is in an adult care facility locally who has developed   urinary incontinence, renal insufficiency and endoscopies found to have   bilobar occlusive hypertrophy of the prostate.  In the office setting, I   discussed with the patient the treatment options including bipolar   transurethral resection of the prostate.  Prior to surgery, we discussed the   risk of the procedure including, but not limited to risk of perioperative   urinary tract infection, risk of urosepsis, risk of urethral strictures,   delayed complication of instrumentation.  We discussed the risk of   postoperative pain, swelling, bleeding, as well as the fact that he is at   increased bleeding due to the thrombocytopenia and there is a potential   requirement of transfusion with the risk of acquiring hepatitis B, C, or HIV.        We discussed the fact that after the procedure, 75% of men will have   retrograde ejaculation.  We discussed that this may not improve his   incontinence and we discussed the fact that the procedure will fail to improve   symptoms in 5-7% of men.   We also discussed the risk of complete incontinence   in 1% of cases and the perioperative risk of deep vein thrombosis, pulmonary   embolism, aspiration pneumonia, heart attack, stroke and death were also   discussed and informed consent was given to me by the patient to proceed.      PROCEDURE IN DETAIL:  After informed consent was obtained, the patient was   brought to the operating room and placed supine.  Bilateral sequential   compression devices were in place and operational and a general laryngeal mask   anesthetic administered in balanced fashion by Dr. Barajas.  The patient   received intravenous Ancef 2 g IV piggyback, was positioned in modified   lithotomy and the operative area was Betadine prepped and draped in usual   sterile fashion.  A surgical timeout was called.  All members of the operative   team agree as the patient's name, procedure to be performed without   objections, attention was directed towards procedure.      I began the procedure by calibrating the urethra with Logan sounds from   22-28 Indian.  I then passed a 26-Indian resectoscope with the bipolar loop   into the bladder.  The bladder shows severe trabeculation.  Both ureteral   orifices were identified.  There is an elevated median bar.  I marked the   verumontanum with coagulation current.  At the level of verumontanum, I marked   the anterior commissure and lateral lobes.  At this point in time, I began   the resection, resecting the bladder neck to open this avoiding the ureteral   orifices.  I then resected the anterior commissure to surgical capsule to the   level of the verumontanum.  The left lobe was resected from 12 o'clock to 5   o'clock.  Apical tissue was left in place.  Bleeding points were cauterized.    The right lobe was resected from 12 o'clock to 7 o'clock, then the floor was   resected sparing the verumontanum.  At the end, I resected the apical tissue,   being careful to avoid resection beyond the  verumontanum.  At the completion   of the case, prostatic chips were removed.  I used a button electrode for   coagulation current for excellent hemostasis and with the bladder full,   withdrew the resectoscope, passed a 22-Taiwanese Velasco without difficulty into   the bladder, inflated the balloon to 30 mL, applied gentle traction and the   catheter irrigated easily and was left to gravity drainage.  The inflow port   was plugged.  At the end the case, I placed a 30 mg suppository per rectum of   belladonna and opium for bladder spasm.  He tolerated this procedure well   without complication, was awakened in the operating room and transferred to   recovery room where he arrived in stable condition.       ____________________________________     MD MARJAN Jones / CHRISTELLE    DD:  06/05/2020 16:33:37  DT:  06/05/2020 17:47:54    D#:  [id number]  Job#:  [id number]

## 2020-06-06 NOTE — ANESTHESIA POSTPROCEDURE EVALUATION
Patient: Kaushik Siegel    Procedure Summary     Date:  06/05/20 Room / Location:  [place] [address] / SURGERY [facility]    Anesthesia Start:  1447 Anesthesia Stop:  1600    Procedures:       TURP, USING BUTTON ELECTRODE- BIPOLAR (Urethra)      CYSTOSCOPY- RIGID (Bladder) Diagnosis:  (BENIGN PROSTATIC HYPERPLASIA)    Surgeon:  Reji Cox M.D. Responsible Provider:  Eleonora Barajas M.D.    Anesthesia Type:  general ASA Status:  2          Final Anesthesia Type: general  Last vitals  BP   Blood Pressure : 137/85    Temp   36.6 °C (97.9 °F)    Pulse   Pulse: 77   Resp   15    SpO2   97 %      Anesthesia Post Evaluation    Patient location during evaluation: PACU  Patient participation: complete - patient participated  Level of consciousness: awake and alert    Airway patency: patent  Anesthetic complications: no  Cardiovascular status: hemodynamically stable  Respiratory status: acceptable  Hydration status: euvolemic    PONV: none           Nurse Pain Score: 0 (NPRS)

## 2020-06-07 PROCEDURE — 700102 HCHG RX REV CODE 250 W/ 637 OVERRIDE(OP): Performed by: UROLOGY

## 2020-06-07 PROCEDURE — A9270 NON-COVERED ITEM OR SERVICE: HCPCS | Performed by: UROLOGY

## 2020-06-07 PROCEDURE — 700102 HCHG RX REV CODE 250 W/ 637 OVERRIDE(OP): Performed by: PHYSICIAN ASSISTANT

## 2020-06-07 PROCEDURE — 770001 HCHG ROOM/CARE - MED/SURG/GYN PRIV*

## 2020-06-07 PROCEDURE — A9270 NON-COVERED ITEM OR SERVICE: HCPCS | Performed by: PHYSICIAN ASSISTANT

## 2020-06-07 RX ADMIN — CARBIDOPA AND LEVODOPA 1 TABLET: 10; 100 TABLET ORAL at 11:44

## 2020-06-07 RX ADMIN — CARBIDOPA AND LEVODOPA 1 TABLET: 10; 100 TABLET ORAL at 17:13

## 2020-06-07 RX ADMIN — TAMSULOSIN HYDROCHLORIDE 0.4 MG: 0.4 CAPSULE ORAL at 07:26

## 2020-06-07 RX ADMIN — FLUPHENAZINE HYDROCHLORIDE 2.5 MG: 1 TABLET, FILM COATED ORAL at 05:02

## 2020-06-07 RX ADMIN — HYDROCHLOROTHIAZIDE 12.5 MG: 12.5 TABLET ORAL at 05:02

## 2020-06-07 RX ADMIN — LISINOPRIL 20 MG: 20 TABLET ORAL at 05:02

## 2020-06-07 RX ADMIN — CITALOPRAM HYDROBROMIDE 20 MG: 20 TABLET ORAL at 05:02

## 2020-06-07 RX ADMIN — DONEPEZIL HYDROCHLORIDE 5 MG: 5 TABLET, FILM COATED ORAL at 20:59

## 2020-06-07 RX ADMIN — ATORVASTATIN CALCIUM 20 MG: 20 TABLET, FILM COATED ORAL at 20:59

## 2020-06-07 RX ADMIN — TRAZODONE HYDROCHLORIDE 150 MG: 150 TABLET ORAL at 20:59

## 2020-06-07 RX ADMIN — NICOTINE 14 MG: 14 PATCH TRANSDERMAL at 05:10

## 2020-06-07 RX ADMIN — CARBIDOPA AND LEVODOPA 1 TABLET: 10; 100 TABLET ORAL at 05:02

## 2020-06-07 NOTE — CARE PLAN
Problem: Communication  Goal: The ability to communicate needs accurately and effectively will improve  Outcome: PROGRESSING AS EXPECTED  Intervention: Mal[place] patient and significant other/support system to call light to alert staff of needs  Note: Education provided on use of call light. Pt demonstrates understanding by using call light appropriately.      Problem: Safety  Goal: Will remain free from falls  Outcome: PROGRESSING AS EXPECTED  Intervention: Assess risk factors for falls  Note: Call light within reach, bed locked in low position, personal belongings within reach, treaded socks on, all needs met at this time.

## 2020-06-07 NOTE — PROGRESS NOTES
Pt is A+Ox4   Complains of 5/10 pain; medicated per MAR     Tolerating a regular diet   Denies N/V/D     VSS     -BM   Voiding adequately     Bed locked and in the lowest position, call light within reach, all needs met at this time   Hourly rounding in place

## 2020-06-07 NOTE — PROGRESS NOTES
"A&Ox4. When ask who is the president pt states \"me Kaushik\"  VSS.   Pt denies pain at this time.   Saturating >90% on RA. Denies SOB.  Abdomen soft, + bowel sounds, + flatus, LBM 6/4 (PTA). Tolerating regular diet, denies n/v.   Pt voiding adequately, pink tinged urine.   Pt ambulates SBA. Bed alarm on. Pt impulsive.   Pt asking to shower multiple times a day, per , this is his baseline.   Updated on plan of care. Safety education provided. Bed locked in low. Call light within   "

## 2020-06-08 ENCOUNTER — PATIENT OUTREACH (OUTPATIENT)
Dept: HEALTH INFORMATION MANAGEMENT | Facility: OTHER | Age: 70
End: 2020-06-08

## 2020-06-08 VITALS
HEIGHT: 73 IN | TEMPERATURE: 97.6 F | WEIGHT: 205.69 LBS | SYSTOLIC BLOOD PRESSURE: 113 MMHG | OXYGEN SATURATION: 98 % | HEART RATE: 74 BPM | RESPIRATION RATE: 18 BRPM | BODY MASS INDEX: 27.26 KG/M2 | DIASTOLIC BLOOD PRESSURE: 77 MMHG

## 2020-06-08 LAB
COVID ORDER STATUS COVID19: NORMAL
SARS-COV-2 RNA RESP QL NAA+PROBE: NOTDETECTED
SPECIMEN SOURCE: NORMAL

## 2020-06-08 PROCEDURE — U0004 COV-19 TEST NON-CDC HGH THRU: HCPCS

## 2020-06-08 PROCEDURE — A9270 NON-COVERED ITEM OR SERVICE: HCPCS | Performed by: PHYSICIAN ASSISTANT

## 2020-06-08 PROCEDURE — 700102 HCHG RX REV CODE 250 W/ 637 OVERRIDE(OP): Performed by: PHYSICIAN ASSISTANT

## 2020-06-08 PROCEDURE — 700102 HCHG RX REV CODE 250 W/ 637 OVERRIDE(OP): Performed by: UROLOGY

## 2020-06-08 PROCEDURE — C9803 HOPD COVID-19 SPEC COLLECT: HCPCS | Performed by: UROLOGY

## 2020-06-08 PROCEDURE — A9270 NON-COVERED ITEM OR SERVICE: HCPCS | Performed by: UROLOGY

## 2020-06-08 RX ADMIN — CARBIDOPA AND LEVODOPA 1 TABLET: 10; 100 TABLET ORAL at 06:14

## 2020-06-08 RX ADMIN — FLUPHENAZINE HYDROCHLORIDE 2.5 MG: 1 TABLET, FILM COATED ORAL at 06:14

## 2020-06-08 RX ADMIN — TAMSULOSIN HYDROCHLORIDE 0.4 MG: 0.4 CAPSULE ORAL at 10:42

## 2020-06-08 RX ADMIN — METOPROLOL SUCCINATE 25 MG: 25 TABLET, EXTENDED RELEASE ORAL at 06:16

## 2020-06-08 RX ADMIN — CITALOPRAM HYDROBROMIDE 20 MG: 20 TABLET ORAL at 06:14

## 2020-06-08 RX ADMIN — LISINOPRIL 20 MG: 20 TABLET ORAL at 06:16

## 2020-06-08 RX ADMIN — HYDROCHLOROTHIAZIDE 12.5 MG: 12.5 TABLET ORAL at 06:14

## 2020-06-08 RX ADMIN — CARBIDOPA AND LEVODOPA 1 TABLET: 10; 100 TABLET ORAL at 13:00

## 2020-06-08 RX ADMIN — NICOTINE 14 MG: 14 PATCH TRANSDERMAL at 06:14

## 2020-06-08 ASSESSMENT — PATIENT HEALTH QUESTIONNAIRE - PHQ9
1. LITTLE INTEREST OR PLEASURE IN DOING THINGS: NOT AT ALL
SUM OF ALL RESPONSES TO PHQ9 QUESTIONS 1 AND 2: 0
2. FEELING DOWN, DEPRESSED, IRRITABLE, OR HOPELESS: NOT AT ALL

## 2020-06-08 NOTE — DISCHARGE INSTRUCTIONS
Postsurgical Bleeding  You have developed bleeding after surgery. A little bleeding following surgery may be normal. Sometimes a second surgery to stop the bleeding is needed.   SYMPTOMS   The problems of bleeding following surgery depend on the amount and location of the bleeding:  · Sometimes there is a little bleeding from a wound following surgery. This is extremely common. It is not usually problem.   · Some surgeries will always have a small amount of bleeding following the surgery. An example of this would be a D & C (dilatation and curettage). This is a procedure where the inside of the uterus is scraped out. Because the surface is raw inside the uterus after the procedure, there is almost always some bleeding or oozing.   · Occasionally there may be a small vessel that either breaks loose from a suture (stitch) or a vessel that was not bleeding during the procedure because of spasm in the vessel. Then when the spasm goes away after surgery, bleeding begins.   · Bleeding into your brain after brain surgery happens occasionally and is very dangerous.   DIAGNOSIS   Your caregiver will often know what is wrong by examining you.   TREATMENT   · The treatment of bleeding problems following surgery will depend on the amount and the location.   · Your caregiver will decide if it is safe to watch this. If the problem does not get better, some additional surgery may be needed.   · Worrisome bleeding may require faster action and more surgery. It may be necessary to take you immediately back to surgery if there is a sudden loss of blood pressure following surgery. There may not be time to consult with family, or even the patient, if the problems are sudden and severe.   · A blood transfusion may be needed.   HOME CARE INSTRUCTIONS  If you have questions about your care, discuss them with your caregiver. Make sure all of your questions are answered.  SEEK MEDICAL CARE IF:  Bleeding is increased, or there is increased  pain, swelling, heat or redness in the wound or you develop an unexplained temperature.  Document Released: 03/09/2006 Document Revised: 03/11/2013 Document Reviewed: 11/13/2008  ExitCare® Patient Information ©2013 [employer].Discharge Instructions    Discharged to group home by medical transportation with escort. Discharged via wheelchair, hospital escort: Yes.  Special equipment needed: Not Applicable    Be sure to schedule a follow-up appointment with your primary care doctor or any specialists as instructed.     Discharge Plan:   Diet Plan: Discussed  Activity Level: Discussed  Smoking Cessation Offered: Patient Refused  Confirmed Follow up Appointment: Appointment Scheduled  Confirmed Symptoms Management: Discussed  Medication Reconciliation Updated: No (Comments)(Dr. Do aware, states pt has hard copy presciptions, ok to discahrge without)    I understand that a diet low in cholesterol, fat, and sodium is recommended for good health. Unless I have been given specific instructions below for another diet, I accept this instruction as my diet prescription.   Other diet: regular    Special Instructions: None    · Is patient discharged on Warfarin / Coumadin?   No     Depression / Suicide Risk    As [facility] are [facility], it is important to learn how to keep safe from harming yourself.    Recognize the warning signs:  · Abrupt changes in personality, positive or negative- including increase in energy   · Giving away possessions  · Change in eating patterns- significant weight changes-  positive or negative  · Change in sleeping patterns- unable to sleep or sleeping all the time   · Unwillingness or inability to communicate  · Depression  · Unusual sadness, discouragement and loneliness  · Talk of wanting to die  · Neglect of personal appearance   · Rebelliousness- reckless behavior  · Withdrawal from people/activities they love  · Confusion- inability to concentrate     If you  or a loved one observes any of these behaviors or has concerns about self-harm, here's what you can do:  · Talk about it- your feelings and reasons for harming yourself  · Remove any means that you might use to hurt yourself (examples: pills, rope, extension cords, firearm)  · Get professional help from the community (Mental Health, Substance Abuse, psychological counseling)  · Do not be alone:Call your Safe Contact- someone whom you trust who will be there for you.  · Call your local CRISIS HOTLINE 160-1710 or 306-636-5038  · Call your local Children's Mobile Crisis Response Team [place] (095) 600-7524 or www.[employer]  · Call the toll free National Suicide Prevention Hotlines   · National Suicide Prevention Lifeline 528-944-WUGY (9147)  · National Hope Line Network 800-SUICIDE (938-8458)

## 2020-06-08 NOTE — DISCHARGE PLANNING
Anticipated Discharge Disposition: Group Home     Action: Communicated and collaborated with Bradley Singleton with  and BS RN for discharge planning. Transport arranged for 1500. No needs at this time.    Barriers to Discharge: none    Plan:Pt to discharge to . Continue to coordinate with health care team for discharge planning.

## 2020-06-08 NOTE — PROGRESS NOTES
Bradley Singleton from called with directions for covid testing to be done before discharge to [facility]. Contact infomation [phone] and email results to [email or web address]

## 2020-06-08 NOTE — PROGRESS NOTES
Pt is A+Ox4   Denies pain at this time       Tolerating a regular diet   Denies N/V/D      VSS      -BM   Voiding adequately     Pt resting comfortably with snacks and fluids      Bed locked and in the lowest position, call light within reach, all needs met at this time, bed alarm on  Hourly rounding in place

## 2020-06-08 NOTE — PROGRESS NOTES
Bedside report received from night shift RN. Assumed care. Pt is A&Ox4. Pt is in bed. Pt denies pain at this time. Pt was updated on the plan of care for the day. All questions answered.   Pt has call light within reach and bed is in lowest position.  All fall precautions in place. Pt had no other needs at this time, hourly rounding in place.  Patient would like to leave this am and is asking to go.  Group home  and caretaker will be back to  patient.

## 2020-07-22 RX ORDER — CITALOPRAM 20 MG/1
TABLET ORAL
Qty: 31 TAB | Refills: 10 | Status: SHIPPED | OUTPATIENT
Start: 2020-07-22

## 2020-09-14 ENCOUNTER — HOSPITAL ENCOUNTER (EMERGENCY)
Dept: HOSPITAL 8 - ED | Age: 70
LOS: 1 days | End: 2020-09-15
Payer: MEDICARE

## 2020-09-14 VITALS — SYSTOLIC BLOOD PRESSURE: 128 MMHG | DIASTOLIC BLOOD PRESSURE: 59 MMHG

## 2020-09-14 VITALS — BODY MASS INDEX: 24.84 KG/M2 | WEIGHT: 187.39 LBS | HEIGHT: 73 IN

## 2020-09-14 DIAGNOSIS — F03.90: Primary | ICD-10-CM

## 2020-09-14 DIAGNOSIS — Z85.46: ICD-10-CM

## 2020-09-14 DIAGNOSIS — G25.0: ICD-10-CM

## 2020-09-14 LAB
<PLATELET ESTIMATE>: (no result)
<PLT MORPHOLOGY>: (no result)
ALBUMIN SERPL-MCNC: 3.3 G/DL (ref 3.4–5)
ALP SERPL-CCNC: 88 U/L (ref 45–117)
ALT SERPL-CCNC: 13 U/L (ref 12–78)
ANION GAP SERPL CALC-SCNC: 5 MMOL/L (ref 5–15)
ANISOCYTOSIS BLD QL SMEAR: (no result)
BILIRUB SERPL-MCNC: 0.3 MG/DL (ref 0.2–1)
CALCIUM SERPL-MCNC: 8.6 MG/DL (ref 8.5–10.1)
CHLORIDE SERPL-SCNC: 107 MMOL/L (ref 98–107)
CREAT SERPL-MCNC: 1.38 MG/DL (ref 0.7–1.3)
EOS#(MANUAL): 0.14 X10^3/UL (ref 0–0.4)
EOS% (MANUAL): 1 % (ref 1–7)
ERYTHROCYTE [DISTWIDTH] IN BLOOD BY AUTOMATED COUNT: 14 % (ref 9.4–14.8)
LYMPH#(MANUAL): 11.18 X10^3/UL (ref 1–3.4)
LYMPHS% (MANUAL): 81 % (ref 22–44)
MACROCYTES BLD QL SMEAR: (no result)
MCH RBC QN AUTO: 33.8 PG (ref 27.5–34.5)
MCHC RBC AUTO-ENTMCNC: 32.8 G/DL (ref 33.2–36.2)
MCV RBC AUTO: 103.1 FL (ref 81–97)
MD: YES
MONOS#(MANUAL): 0.28 X10^3/UL (ref 0.3–2.7)
MONOS% (MANUAL): 2 % (ref 2–9)
PLATELET # BLD AUTO: 71 X10^3/UL (ref 130–400)
PMV BLD AUTO: 9.9 FL (ref 7.4–10.4)
PROT SERPL-MCNC: 6.3 G/DL (ref 6.4–8.2)
RBC # BLD AUTO: 4.48 X10^6/UL (ref 4.38–5.82)
SEG#(MANUAL): 2.21 X10^3/UL (ref 1.8–6.8)
SEGS% (MANUAL): 16 % (ref 42–75)
T4 FREE SERPL-MCNC: 1.16 NG/DL (ref 0.76–1.46)

## 2020-09-14 PROCEDURE — 85025 COMPLETE CBC W/AUTO DIFF WBC: CPT

## 2020-09-14 PROCEDURE — 84443 ASSAY THYROID STIM HORMONE: CPT

## 2020-09-14 PROCEDURE — 84439 ASSAY OF FREE THYROXINE: CPT

## 2020-09-14 PROCEDURE — 99283 EMERGENCY DEPT VISIT LOW MDM: CPT

## 2020-09-14 PROCEDURE — 36415 COLL VENOUS BLD VENIPUNCTURE: CPT

## 2020-09-14 PROCEDURE — 80053 COMPREHEN METABOLIC PANEL: CPT

## 2020-09-14 NOTE — NUR
PT RESTING IN ROOM. REGULAR RESP. NO ACUTE DISTRESS NOTED. CALL LIGHT IN PLACE. 
WILL CONTINUE TO MONITOR.

## 2020-09-14 NOTE — NUR
PT BIB IN FROM [facility]. PT'S CAREGIVER REPORTS HE WAS SHAKING TODAY, AND 
SHE IS NOT SURE WHY. PT IS ON CARBIDOPA/LEVODOPA. VS STABLE. CAREGIVER UNSURE 
ON MEDICAL HISTORY. PT IS NEW TO THE GROUP HOME (X3 MONTHS) NO ACUTE DISTRESS 
NOTED. PT HAS BEEN SEEN BY DR WILKINSON. CALL LIGHT IN PLACE. WILL CONTINUE TO 
MONITOR.

## 2020-11-03 ENCOUNTER — OFFICE VISIT (OUTPATIENT)
Dept: URGENT CARE | Facility: CLINIC | Age: 70
End: 2020-11-03
Payer: MEDICARE

## 2020-11-03 VITALS
RESPIRATION RATE: 17 BRPM | BODY MASS INDEX: 26.11 KG/M2 | HEART RATE: 71 BPM | SYSTOLIC BLOOD PRESSURE: 120 MMHG | OXYGEN SATURATION: 96 % | WEIGHT: 210 LBS | DIASTOLIC BLOOD PRESSURE: 70 MMHG | TEMPERATURE: 97.8 F | HEIGHT: 75 IN

## 2020-11-03 DIAGNOSIS — R22.9 SKIN MASS: ICD-10-CM

## 2020-11-03 PROCEDURE — 99203 OFFICE O/P NEW LOW 30 MIN: CPT | Performed by: PHYSICIAN ASSISTANT

## 2020-11-03 ASSESSMENT — ENCOUNTER SYMPTOMS
VOMITING: 0
DIZZINESS: 0
DIAPHORESIS: 0
TINGLING: 0
FEVER: 0
SHORTNESS OF BREATH: 0
PALPITATIONS: 0
HEADACHES: 0
CHILLS: 0
WEAKNESS: 0
NAUSEA: 0
MYALGIAS: 0
COUGH: 0
ABDOMINAL PAIN: 0

## 2020-11-03 ASSESSMENT — FIBROSIS 4 INDEX: FIB4 SCORE: 4.14

## 2020-11-04 NOTE — PROGRESS NOTES
Subjective:   Kaushik Siegel is a 69 y.o. male who presents for Cyst (right arm next to right shoulder, growth size of quater, red and irritated)      HPI:  This is a very pleasant 69-year-old male accompanied by his caregiver.  The patient has dementia and is a poor historian.  Caregiver states he has a mass on his right arm that she recently saw.  She states over the last 2 weeks it has slightly grown in size.  He has had some blood drained out of it but denies any pus.  He denies any pain to the area.  Patient states he otherwise feels well.  Denies any fevers, chills, nausea or vomiting.  Caregiver informs me he was recently diagnosed with prostate cancer.    Review of Systems   Constitutional: Negative for chills, diaphoresis, fever and malaise/fatigue.   Respiratory: Negative for cough and shortness of breath.    Cardiovascular: Negative for chest pain and palpitations.   Gastrointestinal: Negative for abdominal pain, nausea and vomiting.   Musculoskeletal: Negative for myalgias.   Skin:        Skin mass on right upper arm progressively growing in size.  Occasionally bleeds.  No purulent discharge.  No pain or tenderness.   Neurological: Negative for dizziness, tingling, weakness and headaches.       Medications:    • atorvastatin Tabs  • benztropine Tabs  • carbidopa-levodopa Tabs  • citalopram Tabs  • donepezil Tabs  • fluphenazine Tabs  • lisinopril-hydrochlorothiazide  • metoprolol SR Tb24  • ondansetron Tabs  • tamsulosin  • traZODone Tabs    Allergies: Patient has no known allergies.    Problem List: Kaushik Siegel has Sepsis (HCC); Sepsis due to urinary tract infection (HCC); Neuropathy; Altered mental status; Memory problem; Thrombocytopenia (HCC); Renal failure; Tremor; and Benign prostatic hyperplasia on their problem list.    Surgical History:  Past Surgical History:   Procedure Laterality Date   • PB TRANSURETHRAL ELEC-SURG PBOSTATECTOM  6/5/2020    Procedure: TURP, USING BUTTON  "ELECTRODE- BIPOLAR;  Surgeon: Reji Cox M.D.;  Location: SURGERY [facility];  Service: Urology   • CYSTOSCOPY  6/5/2020    Procedure: CYSTOSCOPY- RIGID;  Surgeon: Reji Cox M.D.;  Location: SURGERY [facility];  Service: Urology   • OTHER ORTHOPEDIC SURGERY      R arm   • TONSILLECTOMY         Past Social Hx: Kaushik Siegel  reports that he has been smoking cigarettes. He has a 23.00 pack-year smoking history. He has never used smokeless tobacco. He reports that he does not drink alcohol or use drugs.     Past Family Hx:  Kaushik Siegel Family history is unknown by patient.     Problem list, medications, and allergies reviewed by myself today in Epic.     Objective:     /70 (BP Location: Left arm, Patient Position: Sitting, BP Cuff Size: Adult)   Pulse 71   Temp 36.6 °C (97.8 °F) (Temporal)   Resp 17   Ht 1.892 m (6' 2.5\")   Wt 95.3 kg (210 lb)   SpO2 96%   BMI 26.60 kg/m²     Physical Exam  Constitutional:       General: He is not in acute distress.     Appearance: Normal appearance. He is not ill-appearing, toxic-appearing or diaphoretic.      Comments: Patient is disoriented in clinic.  Has a history of dementia.   HENT:      Head: Normocephalic and atraumatic.   Eyes:      Conjunctiva/sclera: Conjunctivae normal.   Neck:      Musculoskeletal: Normal range of motion. No neck rigidity.   Cardiovascular:      Rate and Rhythm: Normal rate and regular rhythm.   Pulmonary:      Effort: Pulmonary effort is normal.      Breath sounds: Normal breath sounds.   Abdominal:      General: Bowel sounds are normal.   Musculoskeletal: Normal range of motion.         General: No swelling.   Lymphadenopathy:      Cervical: No cervical adenopathy.   Skin:     Capillary Refill: Capillary refill takes less than 2 seconds.      Comments: Skin mass on right upper arm.  See attached clinical image.                 Assessment/Plan:     Diagnosis and associated orders:     1. Skin mass    - " REFERRAL TO DERMATOLOGY     Comments/MDM:       • Skin mass on patient's right upper extremity.  Mass is nontender to palpation. No underlying fluctuance present.  Lesion consistent with possible squamous cell carcinoma.  • Referred the patient to dermatology.  • Recommended following up with PCP later this week for a biopsy.  • Discussed red flag symptoms with his caregiver today.  If he experiences any fevers, chills, nausea, vomiting, shortness of breath, increasing pain or redness or increasing fatigue would like him to return to the clinic or nearest emergency department.           Differential diagnosis, natural history, supportive care, and indications for immediate follow-up discussed.    Advised the patient to follow-up with the primary care physician for recheck, reevaluation, and consideration of further management.    Please note that this dictation was created using voice recognition software. I have made reasonable attempt to correct obvious errors, but I expect that there are errors of grammar and possibly content that I did not discover before finalizing the note.    This note was electronically signed by EMILY Acuna PA-C

## 2021-01-15 DIAGNOSIS — Z23 NEED FOR VACCINATION: ICD-10-CM

## 2021-05-11 ENCOUNTER — APPOINTMENT (RX ONLY)
Dept: URBAN - METROPOLITAN AREA CLINIC 22 | Facility: CLINIC | Age: 71
Setting detail: DERMATOLOGY
End: 2021-05-11

## 2021-05-11 PROBLEM — D48.5 NEOPLASM OF UNCERTAIN BEHAVIOR OF SKIN: Status: ACTIVE | Noted: 2021-05-11

## 2021-05-11 PROCEDURE — 11102 TANGNTL BX SKIN SINGLE LES: CPT

## 2021-05-11 PROCEDURE — ? BIOPSY BY SHAVE METHOD

## 2021-05-11 NOTE — PROCEDURE: BIOPSY BY SHAVE METHOD
Detail Level: Detailed
Depth Of Biopsy: dermis
Was A Bandage Applied: Yes
Size Of Lesion In Cm: 2.8
X Size Of Lesion In Cm: 2.7
Biopsy Type: H and E
Biopsy Method: Personna blade
Anesthesia Type: 1% lidocaine with epinephrine and a 1:10 solution of 8.4% sodium bicarbonate
Anesthesia Volume In Cc: 3
Additional Anesthesia Volume In Cc (Will Not Render If 0): 0
Hemostasis: Electricator
Wound Care: Petrolatum
Dressing: Band-Aid
Destruction After The Procedure: No
Type Of Destruction Used: Curettage
Curettage Text: The wound bed was treated with curettage after the biopsy was performed.
Cryotherapy Text: The wound bed was treated with cryotherapy after the biopsy was performed.
Electrodesiccation Text: The wound bed was treated with electrodesiccation after the biopsy was performed.
Electrodesiccation And Curettage Text: The wound bed was treated with electrodesiccation and curettage after the biopsy was performed.
Silver Nitrate Text: The wound bed was treated with silver nitrate after the biopsy was performed.
Lab: [id number]
Lab Facility: 
Consent: Written consent was obtained and risks were reviewed including but not limited to scarring, infection, bleeding, scabbing, incomplete removal, nerve damage and allergy to anesthesia.
Post-Care Instructions: I reviewed with the patient in detail post-care instructions. Patient is to keep the biopsy site dry overnight, and then apply bacitracin twice daily until healed. Patient may apply hydrogen peroxide soaks to remove any crusting.
Notification Instructions: Patient will be notified of biopsy results. However, patient instructed to call the office if not contacted within 2 weeks.
Billing Type: Third-Party Bill
Information: Selecting Yes will display possible errors in your note based on the variables you have selected. This validation is only offered as a suggestion for you. PLEASE NOTE THAT THE VALIDATION TEXT WILL BE REMOVED WHEN YOU FINALIZE YOUR NOTE. IF YOU WANT TO FAX A PRELIMINARY NOTE YOU WILL NEED TO TOGGLE THIS TO 'NO' IF YOU DO NOT WANT IT IN YOUR FAXED NOTE.

## 2021-06-10 ENCOUNTER — OFFICE VISIT (OUTPATIENT)
Dept: NEUROLOGY | Facility: MEDICAL CENTER | Age: 71
End: 2021-06-10
Attending: PSYCHIATRY & NEUROLOGY
Payer: MEDICARE

## 2021-06-10 VITALS
BODY MASS INDEX: 26.69 KG/M2 | WEIGHT: 208 LBS | TEMPERATURE: 98.4 F | RESPIRATION RATE: 20 BRPM | OXYGEN SATURATION: 96 % | HEIGHT: 74 IN | HEART RATE: 75 BPM | DIASTOLIC BLOOD PRESSURE: 74 MMHG | SYSTOLIC BLOOD PRESSURE: 122 MMHG

## 2021-06-10 DIAGNOSIS — G20.C PARKINSONISM, UNSPECIFIED PARKINSONISM TYPE (HCC): ICD-10-CM

## 2021-06-10 DIAGNOSIS — R41.82 MENTAL STATUS, DECREASED: Primary | ICD-10-CM

## 2021-06-10 PROCEDURE — 99202 OFFICE O/P NEW SF 15 MIN: CPT | Performed by: PSYCHIATRY & NEUROLOGY

## 2021-06-10 PROCEDURE — 99215 OFFICE O/P EST HI 40 MIN: CPT | Performed by: PSYCHIATRY & NEUROLOGY

## 2021-06-10 ASSESSMENT — FIBROSIS 4 INDEX: FIB4 SCORE: 4.2

## 2021-06-10 NOTE — PROGRESS NOTES
"  Follow-up visit    Patient: Kaushik Siegel  : 1950    Referring Physician:  SAVITA Rojas    CC: seizures    IMPRESSION:  History of altered mental status with unremarkable seizure workup. Not on AED  Limited history.     Parkinsonism seems to be the most prominent issue at this time. ,   presumably he is on low dose of sinemet but unable to confirm       PLAN:  1. Parkinsonism, unspecified Parkinsonism type (HCC)  - REFERRAL TO NEUROLOGY    2. Mental status, decreased  - REFERRAL TO NEURODIAGNOSTICS (EEG,EP,EMG/NCS/DBS)    3. Management options were discussed with patient, will repeat EEG  4. No medication change is indicated at this time.   5. Return in 3 months or sooner with Dr. Cruz, our movement disorder specialist. Will schedule follow up with me if EEG confirms risk for seizure.         HISTORY:    I had the opportunity to see . Kaushik Siegel in the epilepsy clinic on 6/10/2021 for follow up on seizure disorder. He came alone. No caretaker was here.  he was last seen by  on 2020. He is not sure why he is here but he would like to know if he has any stomach, lung caner. Very limited information. Most information is obtained via chart review.    Dominant hand: right handed     In brief, his pertinent medical history is remarkable for tremor/parkinsonism, schizophrenia, htn, hld, thrombocytopenia and history of altered mental status who is here for follow up visit since 2020.     He was initially seen by neurology DR. Malagon in 2018 for \"internittently altered mental status\". Per Dr. Malagon's note in 2018:  Today we reviewed his brain MRI which showed only age related atrophy and a small amount of chronic small vessel ischemic change but nothing to explain altered mental status. His routine video EEG was negative for any focal sharps or spikes to suggest an nunderlying seizure disorder. While he has been antipsychotics for many years which could cause his " "underlying thrombocytopenia and cytomegaly, this needs to be investigated. During both of our encounters, he has been hallucinating, quietly mumbling to himself due to his schizophrenia making it difficult to assess his memory. He was awake and alert, however and able to follow directions. If there is an underlying dementia developing, that will be quite difficult to assess given his psychiatric issues. I suggested a hematology consult to investigate the thrombocytopenia and that he return to his primary care provider for further metabolic workup. If he develops new symptoms or exhibits additional pathology suggestive of seizure activity, they should return for re-evaluation so we can do prolonged EEG monitoring.      At that time He has developed a right hand tremor intermittently on a daily basis over the past few months. He seems to be more interactive with his hallucinations as well. He sees apparitions around the house. He also seems more stiff going up and down stairs.  The tremor comes and goes throughout the day.  He has dropped items from his hand when it comes on. His  accompanies him to today's visit and reports more noticeable short term memory problems. When staff asks him to do a task, he seems to have more difficulty remembering to do it.   He was referred to  Dr. Grady in 2019 for memory changes and tremor who started him on a trial of low dose sinemet  1 tab tid which looks like has been prescribed by PCP for the past year.     He is unable to confirm the medications. He states his sleep and appetite are good. He denies hallucination but he then told me that this morning he had \"Episcopalian upheaval and threw himself on the floor\".  No LOC. No injury.  He denies falls except this morning.     His previous seizure workup including EEG and MRI were unrevealing.       Current AEDs:  None       Previous workup:    1. EEG data: non focal     2. Neuroimaging data: MRI brain age related " atrophy and a small amount of chronic small vessel ischemic change    3. Recent AED level:    Prior antiepileptic medications were reviewed  none     I reviewed the previous medical history, surgical, family and social histories in the electronic medical record.   On review of systems, 10 of 14 systems are reviewed and found to be negative except as listed above.     Current Outpatient Medications   Medication Sig Dispense Refill   • citalopram (CELEXA) 20 MG Tab TAKE 1 TABLET BY MOUTH EVERY MORNING 31 Tab 10   • ondansetron (ZOFRAN) 4 MG Tab tablet Take 1 Tab by mouth every four hours as needed for Nausea/Vomiting. 20 Tab 2   • lisinopril-hydrochlorothiazide (PRINZIDE) 20-12.5 MG per tablet Take 1 Tab by mouth every day.     • donepezil (ARICEPT) 5 MG Tab Take 5 mg by mouth every evening.     • carbidopa-levodopa (SINEMET)  MG Tab TAKE 1 TABLET BY MOUTH THREE TIMES DAILY 93 Tab 11   • tamsulosin (FLOMAX) 0.4 MG capsule Take 0.4 mg by mouth ONE-HALF HOUR AFTER BREAKFAST.     • atorvastatin (LIPITOR) 20 MG Tab Take 20 mg by mouth every evening.     • fluphenazine (PROLIXIN) 2.5 MG Tab Take 2.5 mg by mouth every day.     • metoprolol SR (TOPROL XL) 25 MG TABLET SR 24 HR Take 25 mg by mouth every day.     • traZODone (DESYREL) 150 MG Tab Take 150 mg by mouth every evening.     • benztropine (COGENTIN) 1 MG TABS Take 1 mg by mouth as needed.       No current facility-administered medications for this visit.        No Known Allergies    Past Medical History:   Diagnosis Date   • Depression    • Hyperlipemia    • Hypertension     care giver states well controlled on meds   • Schizophrenia (HCC)    • Urinary incontinence     wears depends       Social History     Socioeconomic History   • Marital status:      Spouse name: Not on file   • Number of children: Not on file   • Years of education: Not on file   • Highest education level: Not on file   Occupational History   • Not on file   Tobacco Use   • Smoking  "status: Current Every Day Smoker     Packs/day: 0.50     Years: 46.00     Pack years: 23.00     Types: Cigarettes   • Smokeless tobacco: Never Used   Vaping Use   • Vaping Use: Never used   Substance and Sexual Activity   • Alcohol use: No   • Drug use: No   • Sexual activity: Not on file   Other Topics Concern   •  Service No   • Blood Transfusions Not Asked   • Caffeine Concern Not Asked   • Occupational Exposure Not Asked   • Hobby Hazards Not Asked   • Sleep Concern Not Asked   • Stress Concern Not Asked   • Weight Concern Not Asked   • Special Diet Not Asked   • Back Care Not Asked   • Exercise Not Asked   • Bike Helmet Not Asked   • Seat Belt Not Asked   • Self-Exams Not Asked   Social History Narrative   • Not on file     Social Determinants of Health     Financial Resource Strain:    • Difficulty of Paying Living Expenses:    Food Insecurity:    • Worried About Running Out of Food in the Last Year:    • Ran Out of Food in the Last Year:    Transportation Needs:    • Lack of Transportation (Medical):    • Lack of Transportation (Non-Medical):    Physical Activity:    • Days of Exercise per Week:    • Minutes of Exercise per Session:    Stress:    • Feeling of Stress :    Social Connections:    • Frequency of Communication with Friends and Family:    • Frequency of Social Gatherings with Friends and Family:    • Attends [employer] Services:    • Active Member of Clubs or Organizations:    • Attends Club or Organization Meetings:    • Marital Status:    Intimate Partner Violence:    • Fear of Current or Ex-Partner:    • Emotionally Abused:    • Physically Abused:    • Sexually Abused:        Family History   Family history unknown: Yes         PHYSICAL EXAM:      /74 (BP Location: Left arm, Patient Position: Sitting, BP Cuff Size: Adult)   Pulse 75   Temp 36.9 °C (98.4 °F) (Temporal)   Resp 20   Ht 1.892 m (6' 2.49\")   Wt 94.3 kg (208 lb)   SpO2 96%   BMI 26.36 kg/m²     On examination,  He " appears his stated age.No apparent distress,  alert and appropriate. No labored breathing.   There is no peripheral edema. Skin: no rash or abnormalities    He knows it is early June 2021, Wednesday.  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 mild dysarthria.     Decreased eye blink, mild cogwheeling bilaterally, resting tremor was noted both hands, more pronounced intention tremor on the left hand. Decreased arm swings and short steps with mild stooped posture and slow turning.     The total visit duration was of 45 minutes of which more than 50% was spent in coordination of care and counseling.         Ahsan Fischer MD  Diplomate, American Board of Psychiatry and Neurology   Diplomate, American Board of Psychiatry and Neurology in Epilepsy

## 2021-06-23 ENCOUNTER — APPOINTMENT (RX ONLY)
Dept: URBAN - METROPOLITAN AREA CLINIC 22 | Facility: CLINIC | Age: 71
Setting detail: DERMATOLOGY
End: 2021-06-23

## 2021-06-23 PROBLEM — C44.612 BASAL CELL CARCINOMA OF SKIN OF RIGHT UPPER LIMB, INCLUDING SHOULDER: Status: ACTIVE | Noted: 2021-06-23

## 2021-06-23 PROCEDURE — 12032 INTMD RPR S/A/T/EXT 2.6-7.5: CPT

## 2021-06-23 PROCEDURE — 11602 EXC TR-EXT MAL+MARG 1.1-2 CM: CPT

## 2021-06-23 PROCEDURE — ? EXCISION

## 2021-06-23 NOTE — PROCEDURE: EXCISION
Surgeon (Optional): Bishnu Chou MD
Biopsy Photograph Reviewed: Yes
Previous Accession (Optional): [id number]
Size Of Lesion In Cm: 0.9
X Size Of Lesion In Cm (Optional): 0.7
Size Of Margin In Cm: 0.4
Anesthesia Volume In Cc: 9
Was An Eye Clamp Used?: No
Eye Clamp Note Details: An eye clamp was used during the procedure.
Excision Method: Excisional Biopsy
Repair Type: Intermediate
Suturegard Retention Suture: 2-0 Nylon
Retention Suture Bite Size: 3 mm
Length To Time In Minutes Device Was In Place: 10
Number Of Hemigard Strips Per Side: 1
Intermediate / Complex Repair - Final Wound Length In Cm: 7.2
Undermining Type: Entire Wound
Debridement Text: The wound edges were debrided prior to proceeding with the closure to facilitate wound healing.
Helical Rim Text: The closure involved the helical rim.
Vermilion Border Text: The closure involved the vermilion border.
Nostril Rim Text: The closure involved the nostril rim.
Retention Suture Text: Retention sutures were placed to support the closure and prevent dehiscence.
Primary Defect Length (In Cm): 0
Suture Removal: 14 days
Lab: [id number]
Lab Facility: 
Graft Donor Site Bandage (Optional-Leave Blank If You Don't Want In Note): Steri-strips and a pressure bandage were applied to the donor site.
Epidermal Closure Graft Donor Site (Optional): simple interrupted
Billing Type: Third-Party Bill
Excision Depth: adipose tissue
Scalpel Size: 15 blade
Anesthesia Type: 1% lidocaine with epinephrine and a 1:10 solution of 8.4% sodium bicarbonate
Additional Anesthesia Volume In Cc: 6
Hemostasis: Electricator
Estimated Blood Loss (Cc): minimal
Detail Level: Detailed
Anesthesia Volume In Cc: 1.5
Deep Sutures: 3-0 Polysorb
Dermal Closure: buried vertical mattress
Epidermal Sutures: 4-0 Surgipro
Wound Care: Petrolatum
Dressing: pressure dressing with telfa
Suturegard Intro: Intraoperative tissue expansion was performed, utilizing the SUTUREGARD device, in order to reduce wound tension.
Suturegard Body: The suture ends were repeatedly re-tightened and re-clamped to achieve the desired tissue expansion.
Hemigard Intro: Due to skin fragility and wound tension, it was decided to use HEMIGARD adhesive retention suture devices to permit a linear closure. The skin was cleaned and dried for a 6cm distance away from the wound. Excessive hair, if present, was removed to allow for adhesion.
Hemigard Postcare Instructions: The HEMIGARD strips are to remain completely dry for at least 5-7 days.
Positioning (Leave Blank If You Do Not Want): The patient was placed in a comfortable position exposing the surgical site.
Pre-Excision Curettage Text (Leave Blank If You Do Not Want): Prior to drawing the surgical margin the visible lesion was removed with electrodesiccation and curettage to clearly define the lesion size.
Complex Repair Preamble Text (Leave Blank If You Do Not Want): Extensive wide undermining was performed.
Intermediate Repair Preamble Text (Leave Blank If You Do Not Want): Undermining was performed with blunt dissection.
Curvilinear Excision Additional Text (Leave Blank If You Do Not Want): The margin was drawn around the clinically apparent lesion.  A curvilinear shape was then drawn on the skin incorporating the lesion and margins.  Incisions were then made along these lines to the appropriate tissue plane and the lesion was extirpated.
Fusiform Excision Additional Text (Leave Blank If You Do Not Want): The margin was drawn around the clinically apparent lesion.  A fusiform shape was then drawn on the skin incorporating the lesion and margins.  Incisions were then made along these lines to the appropriate tissue plane and the lesion was extirpated.
Elliptical Excision Additional Text (Leave Blank If You Do Not Want): The margin was drawn around the clinically apparent lesion.  An elliptical shape was then drawn on the skin incorporating the lesion and margins.  Incisions were then made along these lines to the appropriate tissue plane and the lesion was extirpated.
Saucerization Excision Additional Text (Leave Blank If You Do Not Want): The margin was drawn around the clinically apparent lesion.  Incisions were then made along these lines, in a tangential fashion, to the appropriate tissue plane and the lesion was extirpated.
Slit Excision Additional Text (Leave Blank If You Do Not Want): A linear line was drawn on the skin overlying the lesion. An incision was made slowly until the lesion was visualized.  Once visualized, the lesion was removed with blunt dissection.
Excisional Biopsy Additional Text (Leave Blank If You Do Not Want): The margin was drawn around the clinically apparent lesion. An elliptical shape was then drawn on the skin incorporating the lesion and margins.  Incisions were then made along these lines to the appropriate tissue plane and the lesion was extirpated.
Perilesional Excision Additional Text (Leave Blank If You Do Not Want): The margin was drawn around the clinically apparent lesion. Incisions were then made along these lines to the appropriate tissue plane and the lesion was extirpated.
Repair Performed By Another Provider Text (Leave Blank If You Do Not Want): After the tissue was excised the defect was repaired by another provider.
No Repair - Repaired With Adjacent Surgical Defect Text (Leave Blank If You Do Not Want): After the excision the defect was repaired concurrently with another surgical defect which was in close approximation.
Advancement Flap (Single) Text: The defect edges were debeveled with a #15 scalpel blade.  Given the location of the defect and the proximity to free margins a single advancement flap was deemed most appropriate.  Using a sterile surgical marker, an appropriate advancement flap was drawn incorporating the defect and placing the expected incisions within the relaxed skin tension lines where possible.    The area thus outlined was incised deep to adipose tissue with a #15 scalpel blade.  The skin margins were undermined to an appropriate distance in all directions utilizing iris scissors.
Advancement Flap (Double) Text: The defect edges were debeveled with a #15 scalpel blade.  Given the location of the defect and the proximity to free margins a double advancement flap was deemed most appropriate.  Using a sterile surgical marker, the appropriate advancement flaps were drawn incorporating the defect and placing the expected incisions within the relaxed skin tension lines where possible.    The area thus outlined was incised deep to adipose tissue with a #15 scalpel blade.  The skin margins were undermined to an appropriate distance in all directions utilizing iris scissors.
Burow's Advancement Flap Text: The defect edges were debeveled with a #15 scalpel blade.  Given the location of the defect and the proximity to free margins a Burow's advancement flap was deemed most appropriate.  Using a sterile surgical marker, the appropriate advancement flap was drawn incorporating the defect and placing the expected incisions within the relaxed skin tension lines where possible.    The area thus outlined was incised deep to adipose tissue with a #15 scalpel blade.  The skin margins were undermined to an appropriate distance in all directions utilizing iris scissors.
Chonodrocutaneous Helical Advancement Flap Text: The defect edges were debeveled with a #15 scalpel blade.  Given the location of the defect and the proximity to free margins a chondrocutaneous helical advancement flap was deemed most appropriate.  Using a sterile surgical marker, the appropriate advancement flap was drawn incorporating the defect and placing the expected incisions within the relaxed skin tension lines where possible.    The area thus outlined was incised deep to adipose tissue with a #15 scalpel blade.  The skin margins were undermined to an appropriate distance in all directions utilizing iris scissors.
Crescentic Advancement Flap Text: The defect edges were debeveled with a #15 scalpel blade.  Given the location of the defect and the proximity to free margins a crescentic advancement flap was deemed most appropriate.  Using a sterile surgical marker, the appropriate advancement flap was drawn incorporating the defect and placing the expected incisions within the relaxed skin tension lines where possible.    The area thus outlined was incised deep to adipose tissue with a #15 scalpel blade.  The skin margins were undermined to an appropriate distance in all directions utilizing iris scissors.
A-T Advancement Flap Text: The defect edges were debeveled with a #15 scalpel blade.  Given the location of the defect, shape of the defect and the proximity to free margins an A-T advancement flap was deemed most appropriate.  Using a sterile surgical marker, an appropriate advancement flap was drawn incorporating the defect and placing the expected incisions within the relaxed skin tension lines where possible.    The area thus outlined was incised deep to adipose tissue with a #15 scalpel blade.  The skin margins were undermined to an appropriate distance in all directions utilizing iris scissors.
O-T Advancement Flap Text: The defect edges were debeveled with a #15 scalpel blade.  Given the location of the defect, shape of the defect and the proximity to free margins an O-T advancement flap was deemed most appropriate.  Using a sterile surgical marker, an appropriate advancement flap was drawn incorporating the defect and placing the expected incisions within the relaxed skin tension lines where possible.    The area thus outlined was incised deep to adipose tissue with a #15 scalpel blade.  The skin margins were undermined to an appropriate distance in all directions utilizing iris scissors.
O-L Flap Text: The defect edges were debeveled with a #15 scalpel blade.  Given the location of the defect, shape of the defect and the proximity to free margins an O-L flap was deemed most appropriate.  Using a sterile surgical marker, an appropriate advancement flap was drawn incorporating the defect and placing the expected incisions within the relaxed skin tension lines where possible.    The area thus outlined was incised deep to adipose tissue with a #15 scalpel blade.  The skin margins were undermined to an appropriate distance in all directions utilizing iris scissors.
O-Z Flap Text: The defect edges were debeveled with a #15 scalpel blade.  Given the location of the defect, shape of the defect and the proximity to free margins an O-Z flap was deemed most appropriate.  Using a sterile surgical marker, an appropriate transposition flap was drawn incorporating the defect and placing the expected incisions within the relaxed skin tension lines where possible. The area thus outlined was incised deep to adipose tissue with a #15 scalpel blade.  The skin margins were undermined to an appropriate distance in all directions utilizing iris scissors.
Double O-Z Flap Text: The defect edges were debeveled with a #15 scalpel blade.  Given the location of the defect, shape of the defect and the proximity to free margins a Double O-Z flap was deemed most appropriate.  Using a sterile surgical marker, an appropriate transposition flap was drawn incorporating the defect and placing the expected incisions within the relaxed skin tension lines where possible. The area thus outlined was incised deep to adipose tissue with a #15 scalpel blade.  The skin margins were undermined to an appropriate distance in all directions utilizing iris scissors.
V-Y Flap Text: The defect edges were debeveled with a #15 scalpel blade.  Given the location of the defect, shape of the defect and the proximity to free margins a V-Y flap was deemed most appropriate.  Using a sterile surgical marker, an appropriate advancement flap was drawn incorporating the defect and placing the expected incisions within the relaxed skin tension lines where possible.    The area thus outlined was incised deep to adipose tissue with a #15 scalpel blade.  The skin margins were undermined to an appropriate distance in all directions utilizing iris scissors.
Advancement-Rotation Flap Text: The defect edges were debeveled with a #15 scalpel blade.  Given the location of the defect, shape of the defect and the proximity to free margins an advancement-rotation flap was deemed most appropriate.  Using a sterile surgical marker, an appropriate flap was drawn incorporating the defect and placing the expected incisions within the relaxed skin tension lines where possible. The area thus outlined was incised deep to adipose tissue with a #15 scalpel blade.  The skin margins were undermined to an appropriate distance in all directions utilizing iris scissors.
Mercedes Flap Text: The defect edges were debeveled with a #15 scalpel blade.  Given the location of the defect, shape of the defect and the proximity to free margins a Mercedes flap was deemed most appropriate.  Using a sterile surgical marker, an appropriate advancement flap was drawn incorporating the defect and placing the expected incisions within the relaxed skin tension lines where possible. The area thus outlined was incised deep to adipose tissue with a #15 scalpel blade.  The skin margins were undermined to an appropriate distance in all directions utilizing iris scissors.
Modified Advancement Flap Text: The defect edges were debeveled with a #15 scalpel blade.  Given the location of the defect, shape of the defect and the proximity to free margins a modified advancement flap was deemed most appropriate.  Using a sterile surgical marker, an appropriate advancement flap was drawn incorporating the defect and placing the expected incisions within the relaxed skin tension lines where possible.    The area thus outlined was incised deep to adipose tissue with a #15 scalpel blade.  The skin margins were undermined to an appropriate distance in all directions utilizing iris scissors.
Mucosal Advancement Flap Text: Given the location of the defect, shape of the defect and the proximity to free margins a mucosal advancement flap was deemed most appropriate. Incisions were made with a 15 blade scalpel in the appropriate fashion along the cutaneous vermilion border and the mucosal lip. The remaining actinically damaged mucosal tissue was excised.  The mucosal advancement flap was then elevated to the gingival sulcus with care taken to preserve the neurovascular structures and advanced into the primary defect. Care was taken to ensure that precise realignment of the vermilion border was achieved.
Peng Advancement Flap Text: The defect edges were debeveled with a #15 scalpel blade.  Given the location of the defect, shape of the defect and the proximity to free margins a Peng advancement flap was deemed most appropriate.  Using a sterile surgical marker, an appropriate advancement flap was drawn incorporating the defect and placing the expected incisions within the relaxed skin tension lines where possible. The area thus outlined was incised deep to adipose tissue with a #15 scalpel blade.  The skin margins were undermined to an appropriate distance in all directions utilizing iris scissors.
Hatchet Flap Text: The defect edges were debeveled with a #15 scalpel blade.  Given the location of the defect, shape of the defect and the proximity to free margins a hatchet flap was deemed most appropriate.  Using a sterile surgical marker, an appropriate hatchet flap was drawn incorporating the defect and placing the expected incisions within the relaxed skin tension lines where possible.    The area thus outlined was incised deep to adipose tissue with a #15 scalpel blade.  The skin margins were undermined to an appropriate distance in all directions utilizing iris scissors.
Rotation Flap Text: The defect edges were debeveled with a #15 scalpel blade.  Given the location of the defect, shape of the defect and the proximity to free margins a rotation flap was deemed most appropriate.  Using a sterile surgical marker, an appropriate rotation flap was drawn incorporating the defect and placing the expected incisions within the relaxed skin tension lines where possible.    The area thus outlined was incised deep to adipose tissue with a #15 scalpel blade.  The skin margins were undermined to an appropriate distance in all directions utilizing iris scissors.
Spiral Flap Text: The defect edges were debeveled with a #15 scalpel blade.  Given the location of the defect, shape of the defect and the proximity to free margins a spiral flap was deemed most appropriate.  Using a sterile surgical marker, an appropriate rotation flap was drawn incorporating the defect and placing the expected incisions within the relaxed skin tension lines where possible. The area thus outlined was incised deep to adipose tissue with a #15 scalpel blade.  The skin margins were undermined to an appropriate distance in all directions utilizing iris scissors.
Star Wedge Flap Text: The defect edges were debeveled with a #15 scalpel blade.  Given the location of the defect, shape of the defect and the proximity to free margins a star wedge flap was deemed most appropriate.  Using a sterile surgical marker, an appropriate rotation flap was drawn incorporating the defect and placing the expected incisions within the relaxed skin tension lines where possible. The area thus outlined was incised deep to adipose tissue with a #15 scalpel blade.  The skin margins were undermined to an appropriate distance in all directions utilizing iris scissors.
Transposition Flap Text: The defect edges were debeveled with a #15 scalpel blade.  Given the location of the defect and the proximity to free margins a transposition flap was deemed most appropriate.  Using a sterile surgical marker, an appropriate transposition flap was drawn incorporating the defect.    The area thus outlined was incised deep to adipose tissue with a #15 scalpel blade.  The skin margins were undermined to an appropriate distance in all directions utilizing iris scissors.
Muscle Hinge Flap Text: The defect edges were debeveled with a #15 scalpel blade.  Given the size, depth and location of the defect and the proximity to free margins a muscle hinge flap was deemed most appropriate.  Using a sterile surgical marker, an appropriate hinge flap was drawn incorporating the defect. The area thus outlined was incised with a #15 scalpel blade.  The skin margins were undermined to an appropriate distance in all directions utilizing iris scissors.
Nasal Turnover Hinge Flap Text: The defect edges were debeveled with a #15 scalpel blade.  Given the size, depth, location of the defect and the defect being full thickness a nasal turnover hinge flap was deemed most appropriate.  Using a sterile surgical marker, an appropriate hinge flap was drawn incorporating the defect. The area thus outlined was incised with a #15 scalpel blade. The flap was designed to recreate the nasal mucosal lining and the alar rim. The skin margins were undermined to an appropriate distance in all directions utilizing iris scissors.
Nasalis-Muscle-Based Myocutaneous Island Pedicle Flap Text: Using a #15 blade, an incision was made around the donor flap to the level of the nasalis muscle. Wide lateral undermining was then performed in both the subcutaneous plane above the nasalis muscle, and in a submuscular plane just above periosteum. This allowed the formation of a free nasalis muscle axial pedicle (based on the angular artery) which was still attached to the actual cutaneous flap, increasing its mobility and vascular viability. Hemostasis was obtained with pinpoint electrocoagulation. The flap was mobilized into position and the pivotal anchor points positioned and stabilized with buried interrupted sutures. Subcutaneous and dermal tissues were closed in a multilayered fashion with sutures. Tissue redundancies were excised, and the epidermal edges were apposed without significant tension and sutured with sutures.
Orbicularis Oris Muscle Flap Text: The defect edges were debeveled with a #15 scalpel blade.  Given that the defect affected the competency of the oral sphincter an obicularis oris muscle flap was deemed most appropriate to restore this competency and normal muscle function.  Using a sterile surgical marker, an appropriate flap was drawn incorporating the defect. The area thus outlined was incised with a #15 scalpel blade.
Melolabial Transposition Flap Text: The defect edges were debeveled with a #15 scalpel blade.  Given the location of the defect and the proximity to free margins a melolabial flap was deemed most appropriate.  Using a sterile surgical marker, an appropriate melolabial transposition flap was drawn incorporating the defect.    The area thus outlined was incised deep to adipose tissue with a #15 scalpel blade.  The skin margins were undermined to an appropriate distance in all directions utilizing iris scissors.
Rhombic Flap Text: The defect edges were debeveled with a #15 scalpel blade.  Given the location of the defect and the proximity to free margins a rhombic flap was deemed most appropriate.  Using a sterile surgical marker, an appropriate rhombic flap was drawn incorporating the defect.    The area thus outlined was incised deep to adipose tissue with a #15 scalpel blade.  The skin margins were undermined to an appropriate distance in all directions utilizing iris scissors.
Rhomboid Transposition Flap Text: The defect edges were debeveled with a #15 scalpel blade.  Given the location of the defect and the proximity to free margins a rhomboid transposition flap was deemed most appropriate.  Using a sterile surgical marker, an appropriate rhomboid flap was drawn incorporating the defect.    The area thus outlined was incised deep to adipose tissue with a #15 scalpel blade.  The skin margins were undermined to an appropriate distance in all directions utilizing iris scissors.
Bi-Rhombic Flap Text: The defect edges were debeveled with a #15 scalpel blade.  Given the location of the defect and the proximity to free margins a bi-rhombic flap was deemed most appropriate.  Using a sterile surgical marker, an appropriate rhombic flap was drawn incorporating the defect. The area thus outlined was incised deep to adipose tissue with a #15 scalpel blade.  The skin margins were undermined to an appropriate distance in all directions utilizing iris scissors.
Helical Rim Advancement Flap Text: The defect edges were debeveled with a #15 blade scalpel.  Given the location of the defect and the proximity to free margins (helical rim) a double helical rim advancement flap was deemed most appropriate.  Using a sterile surgical marker, the appropriate advancement flaps were drawn incorporating the defect and placing the expected incisions between the helical rim and antihelix where possible.  The area thus outlined was incised through and through with a #15 scalpel blade.  With a skin hook and iris scissors, the flaps were gently and sharply undermined and freed up.
Bilateral Helical Rim Advancement Flap Text: The defect edges were debeveled with a #15 blade scalpel.  Given the location of the defect and the proximity to free margins (helical rim) a bilateral helical rim advancement flap was deemed most appropriate.  Using a sterile surgical marker, the appropriate advancement flaps were drawn incorporating the defect and placing the expected incisions between the helical rim and antihelix where possible.  The area thus outlined was incised through and through with a #15 scalpel blade.  With a skin hook and iris scissors, the flaps were gently and sharply undermined and freed up.
Ear Star Wedge Flap Text: The defect edges were debeveled with a #15 blade scalpel.  Given the location of the defect and the proximity to free margins (helical rim) an ear star wedge flap was deemed most appropriate.  Using a sterile surgical marker, the appropriate flap was drawn incorporating the defect and placing the expected incisions between the helical rim and antihelix where possible.  The area thus outlined was incised through and through with a #15 scalpel blade.
Banner Transposition Flap Text: The defect edges were debeveled with a #15 scalpel blade.  Given the location of the defect and the proximity to free margins a Banner transposition flap was deemed most appropriate.  Using a sterile surgical marker, an appropriate flap drawn around the defect. The area thus outlined was incised deep to adipose tissue with a #15 scalpel blade.  The skin margins were undermined to an appropriate distance in all directions utilizing iris scissors.
Bilobed Flap Text: The defect edges were debeveled with a #15 scalpel blade.  Given the location of the defect and the proximity to free margins a bilobe flap was deemed most appropriate.  Using a sterile surgical marker, an appropriate bilobe flap drawn around the defect.    The area thus outlined was incised deep to adipose tissue with a #15 scalpel blade.  The skin margins were undermined to an appropriate distance in all directions utilizing iris scissors.
Bilobed Transposition Flap Text: The defect edges were debeveled with a #15 scalpel blade.  Given the location of the defect and the proximity to free margins a bilobed transposition flap was deemed most appropriate.  Using a sterile surgical marker, an appropriate bilobe flap drawn around the defect.    The area thus outlined was incised deep to adipose tissue with a #15 scalpel blade.  The skin margins were undermined to an appropriate distance in all directions utilizing iris scissors.
Trilobed Flap Text: The defect edges were debeveled with a #15 scalpel blade.  Given the location of the defect and the proximity to free margins a trilobed flap was deemed most appropriate.  Using a sterile surgical marker, an appropriate trilobed flap drawn around the defect.    The area thus outlined was incised deep to adipose tissue with a #15 scalpel blade.  The skin margins were undermined to an appropriate distance in all directions utilizing iris scissors.
Dorsal Nasal Flap Text: The defect edges were debeveled with a #15 scalpel blade.  Given the location of the defect and the proximity to free margins a dorsal nasal flap was deemed most appropriate.  Using a sterile surgical marker, an appropriate dorsal nasal flap was drawn around the defect.    The area thus outlined was incised deep to adipose tissue with a #15 scalpel blade.  The skin margins were undermined to an appropriate distance in all directions utilizing iris scissors.
Island Pedicle Flap Text: The defect edges were debeveled with a #15 scalpel blade.  Given the location of the defect, shape of the defect and the proximity to free margins an island pedicle advancement flap was deemed most appropriate.  Using a sterile surgical marker, an appropriate advancement flap was drawn incorporating the defect, outlining the appropriate donor tissue and placing the expected incisions within the relaxed skin tension lines where possible.    The area thus outlined was incised deep to adipose tissue with a #15 scalpel blade.  The skin margins were undermined to an appropriate distance in all directions around the primary defect and laterally outward around the island pedicle utilizing iris scissors.  There was minimal undermining beneath the pedicle flap.
Island Pedicle Flap With Canthal Suspension Text: The defect edges were debeveled with a #15 scalpel blade.  Given the location of the defect, shape of the defect and the proximity to free margins an island pedicle advancement flap was deemed most appropriate.  Using a sterile surgical marker, an appropriate advancement flap was drawn incorporating the defect, outlining the appropriate donor tissue and placing the expected incisions within the relaxed skin tension lines where possible. The area thus outlined was incised deep to adipose tissue with a #15 scalpel blade.  The skin margins were undermined to an appropriate distance in all directions around the primary defect and laterally outward around the island pedicle utilizing iris scissors.  There was minimal undermining beneath the pedicle flap. A suspension suture was placed in the canthal tendon to prevent tension and prevent ectropion.
Alar Island Pedicle Flap Text: The defect edges were debeveled with a #15 scalpel blade.  Given the location of the defect, shape of the defect and the proximity to the alar rim an island pedicle advancement flap was deemed most appropriate.  Using a sterile surgical marker, an appropriate advancement flap was drawn incorporating the defect, outlining the appropriate donor tissue and placing the expected incisions within the nasal ala running parallel to the alar rim. The area thus outlined was incised with a #15 scalpel blade.  The skin margins were undermined minimally to an appropriate distance in all directions around the primary defect and laterally outward around the island pedicle utilizing iris scissors.  There was minimal undermining beneath the pedicle flap.
Double Island Pedicle Flap Text: The defect edges were debeveled with a #15 scalpel blade.  Given the location of the defect, shape of the defect and the proximity to free margins a double island pedicle advancement flap was deemed most appropriate.  Using a sterile surgical marker, an appropriate advancement flap was drawn incorporating the defect, outlining the appropriate donor tissue and placing the expected incisions within the relaxed skin tension lines where possible.    The area thus outlined was incised deep to adipose tissue with a #15 scalpel blade.  The skin margins were undermined to an appropriate distance in all directions around the primary defect and laterally outward around the island pedicle utilizing iris scissors.  There was minimal undermining beneath the pedicle flap.
Island Pedicle Flap-Requiring Vessel Identification Text: The defect edges were debeveled with a #15 scalpel blade.  Given the location of the defect, shape of the defect and the proximity to free margins an island pedicle advancement flap was deemed most appropriate.  Using a sterile surgical marker, an appropriate advancement flap was drawn, based on the axial vessel mentioned above, incorporating the defect, outlining the appropriate donor tissue and placing the expected incisions within the relaxed skin tension lines where possible.    The area thus outlined was incised deep to adipose tissue with a #15 scalpel blade.  The skin margins were undermined to an appropriate distance in all directions around the primary defect and laterally outward around the island pedicle utilizing iris scissors.  There was minimal undermining beneath the pedicle flap.
Keystone Flap Text: The defect edges were debeveled with a #15 scalpel blade.  Given the location of the defect, shape of the defect a keystone flap was deemed most appropriate.  Using a sterile surgical marker, an appropriate keystone flap was drawn incorporating the defect, outlining the appropriate donor tissue and placing the expected incisions within the relaxed skin tension lines where possible. The area thus outlined was incised deep to adipose tissue with a #15 scalpel blade.  The skin margins were undermined to an appropriate distance in all directions around the primary defect and laterally outward around the flap utilizing iris scissors.
O-T Plasty Text: The defect edges were debeveled with a #15 scalpel blade.  Given the location of the defect, shape of the defect and the proximity to free margins an O-T plasty was deemed most appropriate.  Using a sterile surgical marker, an appropriate O-T plasty was drawn incorporating the defect and placing the expected incisions within the relaxed skin tension lines where possible.    The area thus outlined was incised deep to adipose tissue with a #15 scalpel blade.  The skin margins were undermined to an appropriate distance in all directions utilizing iris scissors.
O-Z Plasty Text: The defect edges were debeveled with a #15 scalpel blade.  Given the location of the defect, shape of the defect and the proximity to free margins an O-Z plasty (double transposition flap) was deemed most appropriate.  Using a sterile surgical marker, the appropriate transposition flaps were drawn incorporating the defect and placing the expected incisions within the relaxed skin tension lines where possible.    The area thus outlined was incised deep to adipose tissue with a #15 scalpel blade.  The skin margins were undermined to an appropriate distance in all directions utilizing iris scissors.  Hemostasis was achieved with electrocautery.  The flaps were then transposed into place, one clockwise and the other counterclockwise, and anchored with interrupted buried subcutaneous sutures.
Double O-Z Plasty Text: The defect edges were debeveled with a #15 scalpel blade.  Given the location of the defect, shape of the defect and the proximity to free margins a Double O-Z plasty (double transposition flap) was deemed most appropriate.  Using a sterile surgical marker, the appropriate transposition flaps were drawn incorporating the defect and placing the expected incisions within the relaxed skin tension lines where possible. The area thus outlined was incised deep to adipose tissue with a #15 scalpel blade.  The skin margins were undermined to an appropriate distance in all directions utilizing iris scissors.  Hemostasis was achieved with electrocautery.  The flaps were then transposed into place, one clockwise and the other counterclockwise, and anchored with interrupted buried subcutaneous sutures.
V-Y Plasty Text: The defect edges were debeveled with a #15 scalpel blade.  Given the location of the defect, shape of the defect and the proximity to free margins an V-Y advancement flap was deemed most appropriate.  Using a sterile surgical marker, an appropriate advancement flap was drawn incorporating the defect and placing the expected incisions within the relaxed skin tension lines where possible.    The area thus outlined was incised deep to adipose tissue with a #15 scalpel blade.  The skin margins were undermined to an appropriate distance in all directions utilizing iris scissors.
H Plasty Text: Given the location of the defect, shape of the defect and the proximity to free margins a H-plasty was deemed most appropriate for repair.  Using a sterile surgical marker, the appropriate advancement arms of the H-plasty were drawn incorporating the defect and placing the expected incisions within the relaxed skin tension lines where possible. The area thus outlined was incised deep to adipose tissue with a #15 scalpel blade. The skin margins were undermined to an appropriate distance in all directions utilizing iris scissors.  The opposing advancement arms were then advanced into place in opposite direction and anchored with interrupted buried subcutaneous sutures.
W Plasty Text: The lesion was extirpated to the level of the fat with a #15 scalpel blade.  Given the location of the defect, shape of the defect and the proximity to free margins a W-plasty was deemed most appropriate for repair.  Using a sterile surgical marker, the appropriate transposition arms of the W-plasty were drawn incorporating the defect and placing the expected incisions within the relaxed skin tension lines where possible.    The area thus outlined was incised deep to adipose tissue with a #15 scalpel blade.  The skin margins were undermined to an appropriate distance in all directions utilizing iris scissors.  The opposing transposition arms were then transposed into place in opposite direction and anchored with interrupted buried subcutaneous sutures.
Z Plasty Text: The lesion was extirpated to the level of the fat with a #15 scalpel blade.  Given the location of the defect, shape of the defect and the proximity to free margins a Z-plasty was deemed most appropriate for repair.  Using a sterile surgical marker, the appropriate transposition arms of the Z-plasty were drawn incorporating the defect and placing the expected incisions within the relaxed skin tension lines where possible.    The area thus outlined was incised deep to adipose tissue with a #15 scalpel blade.  The skin margins were undermined to an appropriate distance in all directions utilizing iris scissors.  The opposing transposition arms were then transposed into place in opposite direction and anchored with interrupted buried subcutaneous sutures.
Zygomaticofacial Flap Text: Given the location of the defect, shape of the defect and the proximity to free margins a zygomaticofacial flap was deemed most appropriate for repair.  Using a sterile surgical marker, the appropriate flap was drawn incorporating the defect and placing the expected incisions within the relaxed skin tension lines where possible. The area thus outlined was incised deep to adipose tissue with a #15 scalpel blade with preservation of a vascular pedicle.  The skin margins were undermined to an appropriate distance in all directions utilizing iris scissors.  The flap was then placed into the defect and anchored with interrupted buried subcutaneous sutures.
Cheek Interpolation Flap Text: A decision was made to reconstruct the defect utilizing an interpolation axial flap and a staged reconstruction.  A telfa template was made of the defect.  This telfa template was then used to outline the Cheek Interpolation flap.  The donor area for the pedicle flap was then injected with anesthesia.  The flap was excised through the skin and subcutaneous tissue down to the layer of the underlying musculature.  The interpolation flap was carefully excised within this deep plane to maintain its blood supply.  The edges of the donor site were undermined.   The donor site was closed in a primary fashion.  The pedicle was then rotated into position and sutured.  Once the tube was sutured into place, adequate blood supply was confirmed with blanching and refill.  The pedicle was then wrapped with xeroform gauze and dressed appropriately with a telfa and gauze bandage to ensure continued blood supply and protect the attached pedicle.
Cheek-To-Nose Interpolation Flap Text: A decision was made to reconstruct the defect utilizing an interpolation axial flap and a staged reconstruction.  A telfa template was made of the defect.  This telfa template was then used to outline the Cheek-To-Nose Interpolation flap.  The donor area for the pedicle flap was then injected with anesthesia.  The flap was excised through the skin and subcutaneous tissue down to the layer of the underlying musculature.  The interpolation flap was carefully excised within this deep plane to maintain its blood supply.  The edges of the donor site were undermined.   The donor site was closed in a primary fashion.  The pedicle was then rotated into position and sutured.  Once the tube was sutured into place, adequate blood supply was confirmed with blanching and refill.  The pedicle was then wrapped with xeroform gauze and dressed appropriately with a telfa and gauze bandage to ensure continued blood supply and protect the attached pedicle.
Interpolation Flap Text: A decision was made to reconstruct the defect utilizing an interpolation axial flap and a staged reconstruction.  A telfa template was made of the defect.  This telfa template was then used to outline the interpolation flap.  The donor area for the pedicle flap was then injected with anesthesia.  The flap was excised through the skin and subcutaneous tissue down to the layer of the underlying musculature.  The interpolation flap was carefully excised within this deep plane to maintain its blood supply.  The edges of the donor site were undermined.   The donor site was closed in a primary fashion.  The pedicle was then rotated into position and sutured.  Once the tube was sutured into place, adequate blood supply was confirmed with blanching and refill.  The pedicle was then wrapped with xeroform gauze and dressed appropriately with a telfa and gauze bandage to ensure continued blood supply and protect the attached pedicle.
Melolabial Interpolation Flap Text: A decision was made to reconstruct the defect utilizing an interpolation axial flap and a staged reconstruction.  A telfa template was made of the defect.  This telfa template was then used to outline the melolabial interpolation flap.  The donor area for the pedicle flap was then injected with anesthesia.  The flap was excised through the skin and subcutaneous tissue down to the layer of the underlying musculature.  The pedicle flap was carefully excised within this deep plane to maintain its blood supply.  The edges of the donor site were undermined.   The donor site was closed in a primary fashion.  The pedicle was then rotated into position and sutured.  Once the tube was sutured into place, adequate blood supply was confirmed with blanching and refill.  The pedicle was then wrapped with xeroform gauze and dressed appropriately with a telfa and gauze bandage to ensure continued blood supply and protect the attached pedicle.
Mastoid Interpolation Flap Text: A decision was made to reconstruct the defect utilizing an interpolation axial flap and a staged reconstruction.  A telfa template was made of the defect.  This telfa template was then used to outline the mastoid interpolation flap.  The donor area for the pedicle flap was then injected with anesthesia.  The flap was excised through the skin and subcutaneous tissue down to the layer of the underlying musculature.  The pedicle flap was carefully excised within this deep plane to maintain its blood supply.  The edges of the donor site were undermined.   The donor site was closed in a primary fashion.  The pedicle was then rotated into position and sutured.  Once the tube was sutured into place, adequate blood supply was confirmed with blanching and refill.  The pedicle was then wrapped with xeroform gauze and dressed appropriately with a telfa and gauze bandage to ensure continued blood supply and protect the attached pedicle.
Posterior Auricular Interpolation Flap Text: A decision was made to reconstruct the defect utilizing an interpolation axial flap and a staged reconstruction.  A telfa template was made of the defect.  This telfa template was then used to outline the posterior auricular interpolation flap.  The donor area for the pedicle flap was then injected with anesthesia.  The flap was excised through the skin and subcutaneous tissue down to the layer of the underlying musculature.  The pedicle flap was carefully excised within this deep plane to maintain its blood supply.  The edges of the donor site were undermined.   The donor site was closed in a primary fashion.  The pedicle was then rotated into position and sutured.  Once the tube was sutured into place, adequate blood supply was confirmed with blanching and refill.  The pedicle was then wrapped with xeroform gauze and dressed appropriately with a telfa and gauze bandage to ensure continued blood supply and protect the attached pedicle.
Paramedian Forehead Flap Text: A decision was made to reconstruct the defect utilizing an interpolation axial flap and a staged reconstruction.  A telfa template was made of the defect.  This telfa template was then used to outline the paramedian forehead pedicle flap.  The donor area for the pedicle flap was then injected with anesthesia.  The flap was excised through the skin and subcutaneous tissue down to the layer of the underlying musculature.  The pedicle flap was carefully excised within this deep plane to maintain its blood supply.  The edges of the donor site were undermined.   The donor site was closed in a primary fashion.  The pedicle was then rotated into position and sutured.  Once the tube was sutured into place, adequate blood supply was confirmed with blanching and refill.  The pedicle was then wrapped with xeroform gauze and dressed appropriately with a telfa and gauze bandage to ensure continued blood supply and protect the attached pedicle.
Lip Wedge Excision Repair Text: Given the location of the defect and the proximity to free margins a full thickness wedge repair was deemed most appropriate.  Using a sterile surgical marker, the appropriate repair was drawn incorporating the defect and placing the expected incisions perpendicular to the vermilion border.  The vermilion border was also meticulously outlined to ensure appropriate reapproximation during the repair.  The area thus outlined was incised through and through with a #15 scalpel blade.  The muscularis and dermis were reaproximated with deep sutures following hemostasis. Care was taken to realign the vermilion border before proceeding with the superficial closure.  Once the vermilion was realigned the superfical and mucosal closure was finished.
Ftsg Text: The defect edges were debeveled with a #15 scalpel blade.  Given the location of the defect, shape of the defect and the proximity to free margins a full thickness skin graft was deemed most appropriate.  Using a sterile surgical marker, the primary defect shape was transferred to the donor site. The area thus outlined was incised deep to adipose tissue with a #15 scalpel blade.  The harvested graft was then trimmed of adipose tissue until only dermis and epidermis was left.  The skin margins of the secondary defect were undermined to an appropriate distance in all directions utilizing iris scissors.  The secondary defect was closed with interrupted buried subcutaneous sutures.  The skin edges were then re-apposed with running  sutures.  The skin graft was then placed in the primary defect and oriented appropriately.
Split-Thickness Skin Graft Text: The defect edges were debeveled with a #15 scalpel blade.  Given the location of the defect, shape of the defect and the proximity to free margins a split thickness skin graft was deemed most appropriate.  Using a sterile surgical marker, the primary defect shape was transferred to the donor site. The split thickness graft was then harvested.  The skin graft was then placed in the primary defect and oriented appropriately.
Burow's Graft Text: The defect edges were debeveled with a #15 scalpel blade.  Given the location of the defect, shape of the defect, the proximity to free margins and the presence of a standing cone deformity a Burow's skin graft was deemed most appropriate. The standing cone was removed and this tissue was then trimmed to the shape of the primary defect. The adipose tissue was also removed until only dermis and epidermis were left.  The skin margins of the secondary defect were undermined to an appropriate distance in all directions utilizing iris scissors.  The secondary defect was closed with interrupted buried subcutaneous sutures.  The skin edges were then re-apposed with running  sutures.  The skin graft was then placed in the primary defect and oriented appropriately.
Cartilage Graft Text: The defect edges were debeveled with a #15 scalpel blade.  Given the location of the defect, shape of the defect, the fact the defect involved a full thickness cartilage defect a cartilage graft was deemed most appropriate.  An appropriate donor site was identified, cleansed, and anesthetized. The cartilage graft was then harvested and transferred to the recipient site, oriented appropriately and then sutured into place.  The secondary defect was then repaired using a primary closure.
Composite Graft Text: The defect edges were debeveled with a #15 scalpel blade.  Given the location of the defect, shape of the defect, the proximity to free margins and the fact the defect was full thickness a composite graft was deemed most appropriate.  The defect was outline and then transferred to the donor site.  A full thickness graft was then excised from the donor site. The graft was then placed in the primary defect, oriented appropriately and then sutured into place.  The secondary defect was then repaired using a primary closure.
Epidermal Autograft Text: The defect edges were debeveled with a #15 scalpel blade.  Given the location of the defect, shape of the defect and the proximity to free margins an epidermal autograft was deemed most appropriate.  Using a sterile surgical marker, the primary defect shape was transferred to the donor site. The epidermal graft was then harvested.  The skin graft was then placed in the primary defect and oriented appropriately.
Dermal Autograft Text: The defect edges were debeveled with a #15 scalpel blade.  Given the location of the defect, shape of the defect and the proximity to free margins a dermal autograft was deemed most appropriate.  Using a sterile surgical marker, the primary defect shape was transferred to the donor site. The area thus outlined was incised deep to adipose tissue with a #15 scalpel blade.  The harvested graft was then trimmed of adipose and epidermal tissue until only dermis was left.  The skin graft was then placed in the primary defect and oriented appropriately.
Skin Substitute Text: The defect edges were debeveled with a #15 scalpel blade.  Given the location of the defect, shape of the defect and the proximity to free margins a skin substitute graft was deemed most appropriate.  The graft material was trimmed to fit the size of the defect. The graft was then placed in the primary defect and oriented appropriately.
Tissue Cultured Epidermal Autograft Text: The defect edges were debeveled with a #15 scalpel blade.  Given the location of the defect, shape of the defect and the proximity to free margins a tissue cultured epidermal autograft was deemed most appropriate.  The graft was then trimmed to fit the size of the defect.  The graft was then placed in the primary defect and oriented appropriately.
Xenograft Text: The defect edges were debeveled with a #15 scalpel blade.  Given the location of the defect, shape of the defect and the proximity to free margins a xenograft was deemed most appropriate.  The graft was then trimmed to fit the size of the defect.  The graft was then placed in the primary defect and oriented appropriately.
Purse String (Intermediate) Text: Given the location of the defect and the characteristics of the surrounding skin a purse string intermediate closure was deemed most appropriate.  Undermining was performed circumfirentially around the surgical defect.  A purse string suture was then placed and tightened.
Purse String (Simple) Text: Given the location of the defect and the characteristics of the surrounding skin a purse string simple closure was deemed most appropriate.  Undermining was performed circumferentially around the surgical defect.  A purse string suture was then placed and tightened.
Partial Purse String (Intermediate) Text: Given the location of the defect and the characteristics of the surrounding skin an intermediate purse string closure was deemed most appropriate.  Undermining was performed circumferentially around the surgical defect.  A purse string suture was then placed and tightened. Wound tension of the circular defect prevented complete closure of the wound.
Partial Purse String (Simple) Text: Given the location of the defect and the characteristics of the surrounding skin a simple purse string closure was deemed most appropriate.  Undermining was performed circumferentially around the surgical defect.  A purse string suture was then placed and tightened. Wound tension of the circular defect prevented complete closure of the wound.
Complex Repair And Single Advancement Flap Text: The defect edges were debeveled with a #15 scalpel blade.  The primary defect was closed partially with a complex linear closure.  Given the location of the remaining defect, shape of the defect and the proximity to free margins a single advancement flap was deemed most appropriate for complete closure of the defect.  Using a sterile surgical marker, an appropriate advancement flap was drawn incorporating the defect and placing the expected incisions within the relaxed skin tension lines where possible.    The area thus outlined was incised deep to adipose tissue with a #15 scalpel blade.  The skin margins were undermined to an appropriate distance in all directions utilizing iris scissors.
Complex Repair And Double Advancement Flap Text: The defect edges were debeveled with a #15 scalpel blade.  The primary defect was closed partially with a complex linear closure.  Given the location of the remaining defect, shape of the defect and the proximity to free margins a double advancement flap was deemed most appropriate for complete closure of the defect.  Using a sterile surgical marker, an appropriate advancement flap was drawn incorporating the defect and placing the expected incisions within the relaxed skin tension lines where possible.    The area thus outlined was incised deep to adipose tissue with a #15 scalpel blade.  The skin margins were undermined to an appropriate distance in all directions utilizing iris scissors.
Complex Repair And Modified Advancement Flap Text: The defect edges were debeveled with a #15 scalpel blade.  The primary defect was closed partially with a complex linear closure.  Given the location of the remaining defect, shape of the defect and the proximity to free margins a modified advancement flap was deemed most appropriate for complete closure of the defect.  Using a sterile surgical marker, an appropriate advancement flap was drawn incorporating the defect and placing the expected incisions within the relaxed skin tension lines where possible.    The area thus outlined was incised deep to adipose tissue with a #15 scalpel blade.  The skin margins were undermined to an appropriate distance in all directions utilizing iris scissors.
Complex Repair And A-T Advancement Flap Text: The defect edges were debeveled with a #15 scalpel blade.  The primary defect was closed partially with a complex linear closure.  Given the location of the remaining defect, shape of the defect and the proximity to free margins an A-T advancement flap was deemed most appropriate for complete closure of the defect.  Using a sterile surgical marker, an appropriate advancement flap was drawn incorporating the defect and placing the expected incisions within the relaxed skin tension lines where possible.    The area thus outlined was incised deep to adipose tissue with a #15 scalpel blade.  The skin margins were undermined to an appropriate distance in all directions utilizing iris scissors.
Complex Repair And O-T Advancement Flap Text: The defect edges were debeveled with a #15 scalpel blade.  The primary defect was closed partially with a complex linear closure.  Given the location of the remaining defect, shape of the defect and the proximity to free margins an O-T advancement flap was deemed most appropriate for complete closure of the defect.  Using a sterile surgical marker, an appropriate advancement flap was drawn incorporating the defect and placing the expected incisions within the relaxed skin tension lines where possible.    The area thus outlined was incised deep to adipose tissue with a #15 scalpel blade.  The skin margins were undermined to an appropriate distance in all directions utilizing iris scissors.
Complex Repair And O-L Flap Text: The defect edges were debeveled with a #15 scalpel blade.  The primary defect was closed partially with a complex linear closure.  Given the location of the remaining defect, shape of the defect and the proximity to free margins an O-L flap was deemed most appropriate for complete closure of the defect.  Using a sterile surgical marker, an appropriate flap was drawn incorporating the defect and placing the expected incisions within the relaxed skin tension lines where possible.    The area thus outlined was incised deep to adipose tissue with a #15 scalpel blade.  The skin margins were undermined to an appropriate distance in all directions utilizing iris scissors.
Complex Repair And Bilobe Flap Text: The defect edges were debeveled with a #15 scalpel blade.  The primary defect was closed partially with a complex linear closure.  Given the location of the remaining defect, shape of the defect and the proximity to free margins a bilobe flap was deemed most appropriate for complete closure of the defect.  Using a sterile surgical marker, an appropriate advancement flap was drawn incorporating the defect and placing the expected incisions within the relaxed skin tension lines where possible.    The area thus outlined was incised deep to adipose tissue with a #15 scalpel blade.  The skin margins were undermined to an appropriate distance in all directions utilizing iris scissors.
Complex Repair And Melolabial Flap Text: The defect edges were debeveled with a #15 scalpel blade.  The primary defect was closed partially with a complex linear closure.  Given the location of the remaining defect, shape of the defect and the proximity to free margins a melolabial flap was deemed most appropriate for complete closure of the defect.  Using a sterile surgical marker, an appropriate advancement flap was drawn incorporating the defect and placing the expected incisions within the relaxed skin tension lines where possible.    The area thus outlined was incised deep to adipose tissue with a #15 scalpel blade.  The skin margins were undermined to an appropriate distance in all directions utilizing iris scissors.
Complex Repair And Rotation Flap Text: The defect edges were debeveled with a #15 scalpel blade.  The primary defect was closed partially with a complex linear closure.  Given the location of the remaining defect, shape of the defect and the proximity to free margins a rotation flap was deemed most appropriate for complete closure of the defect.  Using a sterile surgical marker, an appropriate advancement flap was drawn incorporating the defect and placing the expected incisions within the relaxed skin tension lines where possible.    The area thus outlined was incised deep to adipose tissue with a #15 scalpel blade.  The skin margins were undermined to an appropriate distance in all directions utilizing iris scissors.
Complex Repair And Rhombic Flap Text: The defect edges were debeveled with a #15 scalpel blade.  The primary defect was closed partially with a complex linear closure.  Given the location of the remaining defect, shape of the defect and the proximity to free margins a rhombic flap was deemed most appropriate for complete closure of the defect.  Using a sterile surgical marker, an appropriate advancement flap was drawn incorporating the defect and placing the expected incisions within the relaxed skin tension lines where possible.    The area thus outlined was incised deep to adipose tissue with a #15 scalpel blade.  The skin margins were undermined to an appropriate distance in all directions utilizing iris scissors.
Complex Repair And Transposition Flap Text: The defect edges were debeveled with a #15 scalpel blade.  The primary defect was closed partially with a complex linear closure.  Given the location of the remaining defect, shape of the defect and the proximity to free margins a transposition flap was deemed most appropriate for complete closure of the defect.  Using a sterile surgical marker, an appropriate advancement flap was drawn incorporating the defect and placing the expected incisions within the relaxed skin tension lines where possible.    The area thus outlined was incised deep to adipose tissue with a #15 scalpel blade.  The skin margins were undermined to an appropriate distance in all directions utilizing iris scissors.
Complex Repair And V-Y Plasty Text: The defect edges were debeveled with a #15 scalpel blade.  The primary defect was closed partially with a complex linear closure.  Given the location of the remaining defect, shape of the defect and the proximity to free margins a V-Y plasty was deemed most appropriate for complete closure of the defect.  Using a sterile surgical marker, an appropriate advancement flap was drawn incorporating the defect and placing the expected incisions within the relaxed skin tension lines where possible.    The area thus outlined was incised deep to adipose tissue with a #15 scalpel blade.  The skin margins were undermined to an appropriate distance in all directions utilizing iris scissors.
Complex Repair And M Plasty Text: The defect edges were debeveled with a #15 scalpel blade.  The primary defect was closed partially with a complex linear closure.  Given the location of the remaining defect, shape of the defect and the proximity to free margins an M plasty was deemed most appropriate for complete closure of the defect.  Using a sterile surgical marker, an appropriate advancement flap was drawn incorporating the defect and placing the expected incisions within the relaxed skin tension lines where possible.    The area thus outlined was incised deep to adipose tissue with a #15 scalpel blade.  The skin margins were undermined to an appropriate distance in all directions utilizing iris scissors.
Complex Repair And Double M Plasty Text: The defect edges were debeveled with a #15 scalpel blade.  The primary defect was closed partially with a complex linear closure.  Given the location of the remaining defect, shape of the defect and the proximity to free margins a double M plasty was deemed most appropriate for complete closure of the defect.  Using a sterile surgical marker, an appropriate advancement flap was drawn incorporating the defect and placing the expected incisions within the relaxed skin tension lines where possible.    The area thus outlined was incised deep to adipose tissue with a #15 scalpel blade.  The skin margins were undermined to an appropriate distance in all directions utilizing iris scissors.
Complex Repair And W Plasty Text: The defect edges were debeveled with a #15 scalpel blade.  The primary defect was closed partially with a complex linear closure.  Given the location of the remaining defect, shape of the defect and the proximity to free margins a W plasty was deemed most appropriate for complete closure of the defect.  Using a sterile surgical marker, an appropriate advancement flap was drawn incorporating the defect and placing the expected incisions within the relaxed skin tension lines where possible.    The area thus outlined was incised deep to adipose tissue with a #15 scalpel blade.  The skin margins were undermined to an appropriate distance in all directions utilizing iris scissors.
Complex Repair And Z Plasty Text: The defect edges were debeveled with a #15 scalpel blade.  The primary defect was closed partially with a complex linear closure.  Given the location of the remaining defect, shape of the defect and the proximity to free margins a Z plasty was deemed most appropriate for complete closure of the defect.  Using a sterile surgical marker, an appropriate advancement flap was drawn incorporating the defect and placing the expected incisions within the relaxed skin tension lines where possible.    The area thus outlined was incised deep to adipose tissue with a #15 scalpel blade.  The skin margins were undermined to an appropriate distance in all directions utilizing iris scissors.
Complex Repair And Dorsal Nasal Flap Text: The defect edges were debeveled with a #15 scalpel blade.  The primary defect was closed partially with a complex linear closure.  Given the location of the remaining defect, shape of the defect and the proximity to free margins a dorsal nasal flap was deemed most appropriate for complete closure of the defect.  Using a sterile surgical marker, an appropriate flap was drawn incorporating the defect and placing the expected incisions within the relaxed skin tension lines where possible.    The area thus outlined was incised deep to adipose tissue with a #15 scalpel blade.  The skin margins were undermined to an appropriate distance in all directions utilizing iris scissors.
Complex Repair And Ftsg Text: The defect edges were debeveled with a #15 scalpel blade.  The primary defect was closed partially with a complex linear closure.  Given the location of the defect, shape of the defect and the proximity to free margins a full thickness skin graft was deemed most appropriate to repair the remaining defect.  The graft was trimmed to fit the size of the remaining defect.  The graft was then placed in the primary defect, oriented appropriately, and sutured into place.
Complex Repair And Burow's Graft Text: The defect edges were debeveled with a #15 scalpel blade.  The primary defect was closed partially with a complex linear closure.  Given the location of the defect, shape of the defect, the proximity to free margins and the presence of a standing cone deformity a Burow's graft was deemed most appropriate to repair the remaining defect.  The graft was trimmed to fit the size of the remaining defect.  The graft was then placed in the primary defect, oriented appropriately, and sutured into place.
Complex Repair And Split-Thickness Skin Graft Text: The defect edges were debeveled with a #15 scalpel blade.  The primary defect was closed partially with a complex linear closure.  Given the location of the defect, shape of the defect and the proximity to free margins a split thickness skin graft was deemed most appropriate to repair the remaining defect.  The graft was trimmed to fit the size of the remaining defect.  The graft was then placed in the primary defect, oriented appropriately, and sutured into place.
Complex Repair And Epidermal Autograft Text: The defect edges were debeveled with a #15 scalpel blade.  The primary defect was closed partially with a complex linear closure.  Given the location of the defect, shape of the defect and the proximity to free margins an epidermal autograft was deemed most appropriate to repair the remaining defect.  The graft was trimmed to fit the size of the remaining defect.  The graft was then placed in the primary defect, oriented appropriately, and sutured into place.
Complex Repair And Dermal Autograft Text: The defect edges were debeveled with a #15 scalpel blade.  The primary defect was closed partially with a complex linear closure.  Given the location of the defect, shape of the defect and the proximity to free margins an dermal autograft was deemed most appropriate to repair the remaining defect.  The graft was trimmed to fit the size of the remaining defect.  The graft was then placed in the primary defect, oriented appropriately, and sutured into place.
Complex Repair And Tissue Cultured Epidermal Autograft Text: The defect edges were debeveled with a #15 scalpel blade.  The primary defect was closed partially with a complex linear closure.  Given the location of the defect, shape of the defect and the proximity to free margins an tissue cultured epidermal autograft was deemed most appropriate to repair the remaining defect.  The graft was trimmed to fit the size of the remaining defect.  The graft was then placed in the primary defect, oriented appropriately, and sutured into place.
Complex Repair And Xenograft Text: The defect edges were debeveled with a #15 scalpel blade.  The primary defect was closed partially with a complex linear closure.  Given the location of the defect, shape of the defect and the proximity to free margins a xenograft was deemed most appropriate to repair the remaining defect.  The graft was trimmed to fit the size of the remaining defect.  The graft was then placed in the primary defect, oriented appropriately, and sutured into place.
Complex Repair And Skin Substitute Graft Text: The defect edges were debeveled with a #15 scalpel blade.  The primary defect was closed partially with a complex linear closure.  Given the location of the remaining defect, shape of the defect and the proximity to free margins a skin substitute graft was deemed most appropriate to repair the remaining defect.  The graft was trimmed to fit the size of the remaining defect.  The graft was then placed in the primary defect, oriented appropriately, and sutured into place.
Path Notes (To The Dermatopathologist): Please check margins. Suture marking the 12 o'clock margin.
Consent was obtained from the patient. The risks and benefits to therapy were discussed in detail. Specifically, the risks of infection, scarring, bleeding, prolonged wound healing, incomplete removal, allergy to anesthesia, nerve injury and recurrence were addressed. Prior to the procedure, the treatment site was clearly identified and confirmed by the patient. All components of Universal Protocol/PAUSE Rule completed.
Post-Care Instructions: I reviewed with the patient in detail post-care instructions. Patient is not to engage in any heavy lifting, exercise, or swimming for the next 14 days. Should the patient develop any fevers, chills, bleeding, severe pain patient will contact the office immediately.
Home Suture Removal Text: Patient was provided a home suture removal kit and will remove their sutures at home.  If they have any questions or difficulties they will call the office.
Where Do You Want The Question To Include Opioid Counseling Located?: Case Summary Tab
Information: Selecting Yes will display possible errors in your note based on the variables you have selected. This validation is only offered as a suggestion for you. PLEASE NOTE THAT THE VALIDATION TEXT WILL BE REMOVED WHEN YOU FINALIZE YOUR NOTE. IF YOU WANT TO FAX A PRELIMINARY NOTE YOU WILL NEED TO TOGGLE THIS TO 'NO' IF YOU DO NOT WANT IT IN YOUR FAXED NOTE.

## 2021-06-29 ENCOUNTER — TELEPHONE (OUTPATIENT)
Dept: NEUROLOGY | Facility: MEDICAL CENTER | Age: 71
End: 2021-06-29
Payer: MEDICARE

## 2021-06-29 DIAGNOSIS — G20.C PARKINSONISM, UNSPECIFIED PARKINSONISM TYPE (HCC): Primary | ICD-10-CM

## 2021-06-29 NOTE — TELEPHONE ENCOUNTER
Received request via: Pharmacy    Was the patient seen in the last year in this department? Yes    Does the patient have an active prescription (recently filled or refills available) for medication(s) requested? No       Last seen by Dr Jolly 06/10/2021   Next appt 09/10/2021 with Dr. Cruz

## 2021-06-30 ENCOUNTER — NON-PROVIDER VISIT (OUTPATIENT)
Dept: NEUROLOGY | Facility: MEDICAL CENTER | Age: 71
End: 2021-06-30
Attending: PSYCHIATRY & NEUROLOGY
Payer: MEDICARE

## 2021-06-30 DIAGNOSIS — R41.82 MENTAL STATUS, DECREASED: ICD-10-CM

## 2021-06-30 PROCEDURE — 95816 EEG AWAKE AND DROWSY: CPT | Performed by: PSYCHIATRY & NEUROLOGY

## 2021-06-30 PROCEDURE — 95816 EEG AWAKE AND DROWSY: CPT | Mod: 26 | Performed by: PSYCHIATRY & NEUROLOGY

## 2021-06-30 NOTE — PROCEDURES
VIDEO ELECTROENCEPHALOGRAM REPORT      Referring provider: Dr. Fischer    DOS: 06/30/21 (total recording of 25 minutes).     INDICATION:  Kaushik Siegel 70 y.o. male presenting with seizure like activity and PD    CURRENT ANTIEPILEPTIC REGIMEN: none     TECHNIQUE: 30 channel video electroencephalogram (EEG) was performed in accordance with the international 10-20 system. The study was reviewed in bipolar and referential montages. The recording examined the patient during   awake and drowsy states    DESCRIPTION OF THE RECORD:  During the wakefulness, the background showed a symmetrical 9 Hz alpha activity posteriorly with amplitude of 70 mV.  There was reactivity to eye closure/opening.  A normal anterior-posterior gradient was noted with faster beta frequencies seen anteriorly.  During drowsiness, theta/delta frequencies were seen.  No sleep attained.   ACTIVATION PROCEDURES:     Hyperventilation was performed by the patient for a total of 3 minutes. The technician performing the test noted good effort. No physiological build up seen.     Intermittent Photic stimulation was performed in a stepwise fashion from 1 to 30 Hz and elicited no photic driving response.     ICTAL AND/OR INTERICTAL FINDINGS:   No focal or generalized epileptiform activity noted. No regional slowing was seen during this routine study.  No clinical events or seizures were reported or recorded during the study.     EKG: sampling of the EKG recording demonstrated sinus rhythm.     EVENTS: none     INTERPRETATION:    This is a normal video EEG recording in the awake and drowsy states.      Note: A normal EEG does not rule out epilepsy.  If the clinical suspicion remains high for seizures, a prolonged recording to capture clinical or subclinical events may be helpful.      Ahsan Fischer MD  Diplomate in Neurology&Epilepsy  Office: [phone]  Fax: [phone]

## 2021-07-08 ENCOUNTER — APPOINTMENT (RX ONLY)
Dept: URBAN - METROPOLITAN AREA CLINIC 22 | Facility: CLINIC | Age: 71
Setting detail: DERMATOLOGY
End: 2021-07-08

## 2021-07-08 DIAGNOSIS — Z48.817 ENCOUNTER FOR SURGICAL AFTERCARE FOLLOWING SURGERY ON THE SKIN AND SUBCUTANEOUS TISSUE: ICD-10-CM

## 2021-07-08 PROCEDURE — ? POST-OP WOUND EVALUATION

## 2021-07-08 PROCEDURE — 99024 POSTOP FOLLOW-UP VISIT: CPT

## 2021-07-08 ASSESSMENT — LOCATION DETAILED DESCRIPTION DERM: LOCATION DETAILED: RIGHT LATERAL SHOULDER

## 2021-07-08 ASSESSMENT — LOCATION SIMPLE DESCRIPTION DERM: LOCATION SIMPLE: RIGHT SHOULDER

## 2021-07-08 ASSESSMENT — LOCATION ZONE DERM: LOCATION ZONE: ARM

## 2021-07-08 NOTE — PROCEDURE: POST-OP WOUND EVALUATION
Detail Level: Detailed
Add 26530 Cpt? (Important Note: In 2017 The Use Of 19630 Is Being Tracked By Cms To Determine Future Global Period Reimbursement For Global Periods): yes
Wound Evaluated By (Optional): Bishnu Chou MD
Wound Diameter In Cm(Optional): 0
Wound Crusting?: clean
Wound Discharge?: minimal discharge
Sutures?: intact
Wound Edema?: mild
Wound Drainage?: serous drainage
Wound Color?: red

## 2021-09-10 ENCOUNTER — OFFICE VISIT (OUTPATIENT)
Dept: NEUROLOGY | Facility: MEDICAL CENTER | Age: 71
End: 2021-09-10
Attending: PSYCHIATRY & NEUROLOGY
Payer: MEDICARE

## 2021-09-10 VITALS
HEART RATE: 84 BPM | RESPIRATION RATE: 18 BRPM | HEIGHT: 74 IN | DIASTOLIC BLOOD PRESSURE: 70 MMHG | BODY MASS INDEX: 25.89 KG/M2 | TEMPERATURE: 97.3 F | SYSTOLIC BLOOD PRESSURE: 118 MMHG | OXYGEN SATURATION: 96 % | WEIGHT: 201.72 LBS

## 2021-09-10 DIAGNOSIS — G21.19 DRUG-INDUCED PARKINSONISM (HCC): ICD-10-CM

## 2021-09-10 PROBLEM — F20.9 SCHIZOPHRENIA (HCC): Status: ACTIVE | Noted: 2021-09-10

## 2021-09-10 PROCEDURE — 99212 OFFICE O/P EST SF 10 MIN: CPT | Performed by: PSYCHIATRY & NEUROLOGY

## 2021-09-10 PROCEDURE — 99213 OFFICE O/P EST LOW 20 MIN: CPT | Performed by: PSYCHIATRY & NEUROLOGY

## 2021-09-10 RX ORDER — PSEUDOEPHED/ACETAMINOPH/DIPHEN 30MG-500MG
500 TABLET ORAL PRN
COMMUNITY
Start: 2021-09-09

## 2021-09-10 RX ORDER — ARIPIPRAZOLE 400 MG
KIT INTRAMUSCULAR
COMMUNITY
Start: 2021-08-26

## 2021-09-10 RX ORDER — GLYCOPYRROLATE AND FORMOTEROL FUMARATE 9; 4.8 UG/1; UG/1
AEROSOL, METERED RESPIRATORY (INHALATION)
COMMUNITY
Start: 2021-09-09

## 2021-09-10 RX ORDER — CLONAZEPAM 0.5 MG/1
0.5 TABLET ORAL
COMMUNITY
Start: 2021-09-09

## 2021-09-10 ASSESSMENT — FIBROSIS 4 INDEX: FIB4 SCORE: 4.2

## 2021-09-10 ASSESSMENT — PATIENT HEALTH QUESTIONNAIRE - PHQ9: CLINICAL INTERPRETATION OF PHQ2 SCORE: 0

## 2021-09-10 NOTE — PROGRESS NOTES
Chief Complaint   Patient presents with   • New Patient     Diagnosed with Parkinson's from previous neurologist-per        History of present illness:  Kaushik Siegel 70 y.o. male with schizophrenia on fluphenazine (D2 antagonist) with parkinsonism noted on exam by Dr. Fischer.     He has had schizophrenia since his 20's. He has been on fluphenazine since Jan 1974. He also receives aripiprazole 400mg injection monthly.   He has a diagnosis of Parkinson's disease for the last year and a half. He is on carbidopa/levodopa for tremors of his hands. He has had tremors for 6 years that are getting worse. His tremor interferes with writing and drinking.     Dr. Kulwant Bradford is his psychiatrist.     Past medical history:   Past Medical History:   Diagnosis Date   • Depression    • Hyperlipemia    • Hypertension     care giver states well controlled on meds   • Schizophrenia (HCC)    • Urinary incontinence     wears depends       Past surgical history:   Past Surgical History:   Procedure Laterality Date   • PB TRANSURETHRAL ELEC-SURG PBOSTATECTOM  6/5/2020    Procedure: TURP, USING BUTTON ELECTRODE- BIPOLAR;  Surgeon: Reji Cox M.D.;  Location: SURGERY [facility];  Service: Urology   • CYSTOSCOPY  6/5/2020    Procedure: CYSTOSCOPY- RIGID;  Surgeon: Reji Cox M.D.;  Location: SURGERY [facility];  Service: Urology   • OTHER ORTHOPEDIC SURGERY      R arm   • TONSILLECTOMY         Family history:   Family History   Family history unknown: Yes       Social history:   Social History     Socioeconomic History   • Marital status:      Spouse name: Not on file   • Number of children: Not on file   • Years of education: Not on file   • Highest education level: Not on file   Occupational History   • Not on file   Tobacco Use   • Smoking status: Current Every Day Smoker     Packs/day: 0.50     Years: 46.00     Pack years: 23.00     Types: Cigarettes   • Smokeless tobacco: Never Used   Vaping Use    • Vaping Use: Never used   Substance and Sexual Activity   • Alcohol use: No   • Drug use: No   • Sexual activity: Not on file   Other Topics Concern   •  Service No   • Blood Transfusions Not Asked   • Caffeine Concern Not Asked   • Occupational Exposure Not Asked   • Hobby Hazards Not Asked   • Sleep Concern Not Asked   • Stress Concern Not Asked   • Weight Concern Not Asked   • Special Diet Not Asked   • Back Care Not Asked   • Exercise Not Asked   • Bike Helmet Not Asked   • Seat Belt Not Asked   • Self-Exams Not Asked   Social History Narrative   • Not on file     Social Determinants of Health     Financial Resource Strain:    • Difficulty of Paying Living Expenses:    Food Insecurity:    • Worried About Running Out of Food in the Last Year:    • Ran Out of Food in the Last Year:    Transportation Needs:    • Lack of Transportation (Medical):    • Lack of Transportation (Non-Medical):    Physical Activity:    • Days of Exercise per Week:    • Minutes of Exercise per Session:    Stress:    • Feeling of Stress :    Social Connections:    • Frequency of Communication with Friends and Family:    • Frequency of Social Gatherings with Friends and Family:    • Attends [employer] Services:    • Active Member of Clubs or Organizations:    • Attends Club or Organization Meetings:    • Marital Status:    Intimate Partner Violence:    • Fear of Current or Ex-Partner:    • Emotionally Abused:    • Physically Abused:    • Sexually Abused:        Current medications:   Current Outpatient Medications   Medication   • clonazePAM (KLONOPIN) 0.5 MG Tab   • BEVESPI AEROSPHERE 9-4.8 MCG/ACT Aerosol   • metoprolol tartrate (LOPRESSOR) 25 MG Tab   • ABILIFY MAINTENA 400 MG IM injection   • ACETAMINOPHEN EXTRA STRENGTH 500 MG Tab   • carbidopa-levodopa (SINEMET)  MG Tab   • citalopram (CELEXA) 20 MG Tab   • ondansetron (ZOFRAN) 4 MG Tab tablet   • lisinopril-hydrochlorothiazide (PRINZIDE) 20-12.5 MG per tablet   •  "donepezil (ARICEPT) 5 MG Tab   • tamsulosin (FLOMAX) 0.4 MG capsule   • fluphenazine (PROLIXIN) 2.5 MG Tab   • metoprolol SR (TOPROL XL) 25 MG TABLET SR 24 HR   • traZODone (DESYREL) 150 MG Tab   • benztropine (COGENTIN) 1 MG TABS   • atorvastatin (LIPITOR) 20 MG Tab     No current facility-administered medications for this visit.       Medication Allergy:  No Known Allergies    Physical examination:   Vitals:    09/10/21 0841 09/10/21 0846   BP: 128/74 118/70   BP Location: Left arm Left arm   Patient Position: Sitting Standing   BP Cuff Size: Adult Adult   Pulse: 64 84   Resp: 18    Temp: 36.3 °C (97.3 °F)    TempSrc: Temporal    SpO2: 97% 96%   Weight: 91.5 kg (201 lb 11.5 oz)    Height: 1.88 m (6' 2\")      Neurological Exam  Mental Status    Decreased facial expression   .    Motor   Increased muscle tone. Axial rigidity  Normal limb tone . The following abnormal movements were seen: +Jaw tremor   +Mild bilateral rest tremor   No action tremor.  Decreased finger and foot tapping amplitude and speed bilaterally. .    Gait  Straight posture   Mild bradykinesia.      Labs:  I reviewed the following labs personally:  None    Imaging:   None     ASSESSMENT AND PLAN:  Problem List Items Addressed This Visit     Drug-induced parkinsonism (HCC)          1. Drug-induced parkinsonism (HCC)    Other orders  - clonazePAM (KLONOPIN) 0.5 MG Tab; Take 0.5 mg by mouth.  - BEVESPI AEROSPHERE 9-4.8 MCG/ACT Aerosol  - metoprolol tartrate (LOPRESSOR) 25 MG Tab; Take 25 mg by mouth every day.  - ABILIFY MAINTENA 400 MG IM injection  - ACETAMINOPHEN EXTRA STRENGTH 500 MG Tab; Take 500 mg by mouth as needed.    I have counseled the patient that his exam is consistent with parkinsonism. He has lip and bilateral rest tremor. Given his history of schizophrenia and long term use of fluphenazine and aripirazole, this is likely drug-induced parkinsonism.   If the patient can wean off of dopamine antagonists and switch to clozapine, his " parkinsonism would likely resolve. This note will be forwarded to his psychiatrist.     FOLLOW-UP:   No follow-ups on file.    Total time spent for the day for this patient unrelated to procedure time is: 24 minutes. I spent 18 minutes in face to face time and I spent 3 minutes pre-charting and 3 minutes in post-visit documentation.      BIRDIE BryantO.  [facility]   Movement Disorders Specialist

## 2021-10-26 PROCEDURE — 99284 EMERGENCY DEPT VISIT MOD MDM: CPT

## 2021-10-26 ASSESSMENT — FIBROSIS 4 INDEX: FIB4 SCORE: 4.2

## 2021-10-27 ENCOUNTER — HOSPITAL ENCOUNTER (EMERGENCY)
Facility: MEDICAL CENTER | Age: 71
End: 2021-10-27
Attending: EMERGENCY MEDICINE | Admitting: EMERGENCY MEDICINE
Payer: MEDICARE

## 2021-10-27 VITALS
RESPIRATION RATE: 18 BRPM | TEMPERATURE: 98.4 F | DIASTOLIC BLOOD PRESSURE: 5 MMHG | BODY MASS INDEX: 26.93 KG/M2 | HEART RATE: 66 BPM | WEIGHT: 198.85 LBS | HEIGHT: 72 IN | OXYGEN SATURATION: 97 % | SYSTOLIC BLOOD PRESSURE: 111 MMHG

## 2021-10-27 DIAGNOSIS — B34.9 ACUTE VIRAL SYNDROME: ICD-10-CM

## 2021-10-27 DIAGNOSIS — U07.1 COVID-19 VIRUS INFECTION: ICD-10-CM

## 2021-10-27 LAB
FLUAV RNA SPEC QL NAA+PROBE: NEGATIVE
FLUBV RNA SPEC QL NAA+PROBE: NEGATIVE
RSV RNA SPEC QL NAA+PROBE: NEGATIVE
SARS-COV-2 RNA RESP QL NAA+PROBE: DETECTED
SPECIMEN SOURCE: ABNORMAL

## 2021-10-27 PROCEDURE — C9803 HOPD COVID-19 SPEC COLLECT: HCPCS | Performed by: EMERGENCY MEDICINE

## 2021-10-27 PROCEDURE — 0241U HCHG SARS-COV-2 COVID-19 NFCT DS RESP RNA 4 TRGT MIC: CPT

## 2021-10-27 ASSESSMENT — ENCOUNTER SYMPTOMS
VOMITING: 0
BACK PAIN: 0
SINUS PAIN: 0
COUGH: 0
ABDOMINAL PAIN: 0
FEVER: 1
FLANK PAIN: 0
DIARRHEA: 0

## 2021-10-27 NOTE — ED PROVIDER NOTES
"ED Provider Note    ED Provider Note    Primary care provider: SAVITA Rojas  Means of arrival: EMS  History obtained from: patient  History limited by: None    CHIEF COMPLAINT  Chief Complaint   Patient presents with   • Flu Like Symptoms     subjective fever, states, \"my head was boiling then it felt like hot steam.\" temp 98.4f. COVID + per EMS, pt unaware of this       HPI  Kaushik Siegel is a 70 y.o. male who presents to the Emergency Department with a chief complaint of having a fever.  Patient asks \"why is my temperature so high\" he believes that he caught the flu yesterday secondary to the cold weather conditions\".  He denies having any pain.  No headache, chest pain or abdominal pain.  No nausea, vomiting or diarrhea.  No urinary complaints.  He denies any medical problems that he takes medication for other than psychiatric illnesses.  He believes he is vaccinated against the flu and Covid.  He reports that he did not take any medication prior to arrival that would reduce his fever.    REVIEW OF SYSTEMS  Review of Systems   Constitutional: Positive for fever.        Subjective   HENT: Negative for sinus pain.    Respiratory: Negative for cough.    Cardiovascular: Negative for chest pain.   Gastrointestinal: Negative for abdominal pain, diarrhea and vomiting.   Genitourinary: Negative for flank pain.   Musculoskeletal: Negative for back pain.       PAST MEDICAL HISTORY   has a past medical history of Dementia ([facility]), Depression, Hyperlipemia, Hypertension, Schizophrenia (HCC), and Urinary incontinence.    SURGICAL HISTORY   has a past surgical history that includes tonsillectomy; other orthopedic surgery; transurethral elec-surg prostatectom (6/5/2020); and cystoscopy (6/5/2020).    SOCIAL HISTORY  Social History     Tobacco Use   • Smoking status: Current Every Day Smoker     Packs/day: 0.50     Years: 46.00     Pack years: 23.00     Types: Cigarettes   • Smokeless tobacco: Never Used   Vaping " Use   • Vaping Use: Never used   Substance Use Topics   • Alcohol use: No   • Drug use: No      Social History     Substance and Sexual Activity   Drug Use No       FAMILY HISTORY  Family History   Family history unknown: Yes       CURRENT MEDICATIONS  Home Medications     Reviewed by Brandy Chilel R.N. (Registered Nurse) on 10/26/21 at 2341  Med List Status: Partial   Medication Last Dose Status   ABILIFY MAINTENA 400 MG IM injection  Active   ACETAMINOPHEN EXTRA STRENGTH 500 MG Tab  Active   atorvastatin (LIPITOR) 20 MG Tab  Active   benztropine (COGENTIN) 1 MG TABS  Active   BEVESPI AEROSPHERE 9-4.8 MCG/ACT Aerosol  Active   carbidopa-levodopa (SINEMET)  MG Tab  Active   citalopram (CELEXA) 20 MG Tab  Active   clonazePAM (KLONOPIN) 0.5 MG Tab  Active   donepezil (ARICEPT) 5 MG Tab  Active   fluphenazine (PROLIXIN) 2.5 MG Tab  Active   lisinopril-hydrochlorothiazide (PRINZIDE) 20-12.5 MG per tablet  Active   metoprolol SR (TOPROL XL) 25 MG TABLET SR 24 HR  Active   metoprolol tartrate (LOPRESSOR) 25 MG Tab  Active   ondansetron (ZOFRAN) 4 MG Tab tablet  Active   tamsulosin (FLOMAX) 0.4 MG capsule  Active   traZODone (DESYREL) 150 MG Tab  Active                ALLERGIES  No Known Allergies    PHYSICAL EXAM  VITAL SIGNS: /56   Pulse 66   Temp 36.9 °C (98.4 °F) (Oral)   Resp 18   Ht 1.829 m (6')   Wt 90.2 kg (198 lb 13.7 oz)   SpO2 91%   BMI 26.97 kg/m²   Vitals reviewed.  Constitutional: Patient is oriented to person, place, and time. Appears well-developed and well-nourished. No distress.    Head: Normocephalic and atraumatic.   Ears: Normal external ears bilaterally.   Mouth/Throat: Oropharynx is clear and moist.  Eyes: Conjunctivae are normal. Pupils are equal and round  Neck: Normal range of motion.  Cardiovascular: Normal rate, regular rhythm and normal heart sounds.   Pulmonary/Chest: Effort normal and breath sounds normal. No respiratory distress, no wheezes, rhonchi, or rales.    Abdominal: Soft. Bowel sounds are normal. There is no tenderness.   Musculoskeletal: No edema and no tenderness.   Lymphadenopathy: No cervical adenopathy.   Neurological: No focal deficits.   Skin: Skin is warm and dry. No erythema. No pallor.   Psychiatric: Patient has a normal mood and affect.     LABS  Results for orders placed or performed during the hospital encounter of 10/27/21   COV-2, FLU A/B, AND RSV BY PCR (2-4 HOURS CEPHEID): Collect NP swab in VTM    Specimen: Nasopharyngeal; Respirate   Result Value Ref Range    Influenza virus A RNA Negative Negative    Influenza virus B, PCR Negative Negative    RSV, PCR Negative Negative    SARS-CoV-2 by PCR DETECTED (AA)     SARS-CoV-2 Source NP Swab        All labs reviewed by me.    COURSE & MEDICAL DECISION MAKING  Pertinent Labs & Imaging studies reviewed. (See chart for details)    Obtained and reviewed past medical records.  Patient's last encounter was in the neurology office September 10 of this year he was a new patient to this physician recently diagnosed with drug-induced parkinsonian syndrome.  Patient was admitted to the hospital in June of last year for BPH.  Patient underwent TURP on June 5.    4:01 AM - Patient seen and examined at bedside.  This is a pleasant, 70-year-old male who presents with a concern for fever although he is not febrile here in the ED.  He has no complaints of pain.  He satting well on room air on my evaluation, 91 to 93%.  There is no increased work of breathing.  Lungs are clear on exam.  Abdomen soft.  He is in no distress.  He will be swabbed for Covid as well as flu.    6:33 AM, patient continues to test positive for Covid.  Influenza a and B-.  Vital signs are normal.  There is no increased work of breathing.  Certainly his Covid infection, explains his elevated temperature that he was sensing previously.  He is afebrile here in the ED.  He is advised on Tylenol or ibuprofen for body aches and fevers.  He is not  hypoxic or tachypneic.  He is not tachycardic.  He can safely be discharged home.  Will contact  for assistance with transfer back to his residential facility.  He is in stable condition.    FINAL IMPRESSION  1. Acute viral syndrome    2. COVID-19 virus infection

## 2021-10-27 NOTE — ED NOTES
jose from Lab called with critical result of covid + at 0630. Critical lab result read back to jose.   Dr. Martinez notified of critical lab result at 0631.  Critical lab result read back by Dr. Martinez.

## 2021-10-27 NOTE — ED TRIAGE NOTES
"Kaushik Siegel  70 y.o. male  Chief Complaint   Patient presents with   • Flu Like Symptoms     subjective fever, states, \"my head was boiling then it felt like hot steam.\" temp 98.4f       Patient MIKE JONES from [facility] for above complaint. Pt is AOx3, situation and history is difficult to obtain. Pt intermittently responding to internal stimuli. Per EMS, pt is COVID + but pt seemed unaware of this and does not know if he is vaccinated.    Placed in Sr Lounge on arrival.    /62   Pulse 68   Temp 36.9 °C (98.4 °F) (Oral)   Resp 18   Ht 1.829 m (6')   Wt 90.2 kg (198 lb 13.7 oz)   SpO2 93%   BMI 26.97 kg/m²       "

## 2021-10-27 NOTE — ED NOTES
Pt ambulated out of room, stating he needs a ride, pt re-oriented and reminded he is not up for discharge yet. NAD noted.

## 2021-10-27 NOTE — ED NOTES
Called [facility] about pt staying at residence, [employer] [phone] provided pt address [address] living in [facility]. No number found online and [facility] did not have housing number either, pt emergency contacts called both out of service. SW notified.

## 2021-10-27 NOTE — ED NOTES
RN rounded on pt, pt not in room, appears to have eloped.  RN called and updated Bernardo at [facility].  Bernardo reported they will send someone looking for pt. RN informed pt has dementia.

## 2021-10-27 NOTE — ED NOTES
Bernardo from [facility] called back and informed this RN that pt made it safely back to [facility] on own.  Per Bernardo pt walked and successfully found way there.  DC summary emailed to Bernardo per request by .

## 2021-10-27 NOTE — DISCHARGE PLANNING
Medical Social Work    MSW received a voalte message from bedside RN that pt is COVID+ and needs transportation to return to [facility] housing (address: [address] [place] [address]).  MSW provided report to STEPHANIE Alexander to arrange transport.

## 2021-10-27 NOTE — DISCHARGE PLANNING
Social Servcies     LSW placed call to [facility], pt has benefits for transport, spoke with Nedra. Stated she would place on schedule to authorize medical transport since pt is COVID positive and unable to take cab there.     @[id number]  LSW received update, pt eloped. LSW faxed most recent ERP to [facility] per COVID housing request. Pt eloped .     Unable to chart on day of service due to Epic being down.

## 2021-10-27 NOTE — ED NOTES
Security and charge RN notified of pt elopement.  Pt last seen at 0715 this am.  Per security unable to call code purple due to timeline.  Description of pt provided to security, they will attempt to locate in hospital.

## (undated) DEVICE — EVACUATOR BLADDER ELLIK - (10/BX)

## (undated) DEVICE — CONNECTOR HOSE NEPTUNE FOR CYSTO ROOM

## (undated) DEVICE — SYRINGE TOOMEY (50EA/CA)

## (undated) DEVICE — GLOVE BIOGEL SZ 7.5 SURGICAL PF LTX - (50PR/BX 4BX/CA)

## (undated) DEVICE — ELECTRODE DUAL RETURN W/ CORD - (50/PK)

## (undated) DEVICE — ELECTRODE 850 FOAM ADHESIVE - HYDROGEL RADIOTRNSPRNT (50/PK)

## (undated) DEVICE — GLOVE BIOGEL PI INDICATOR SZ 7.5 SURGICAL PF LF -(50/BX 4BX/CA)

## (undated) DEVICE — ZIPWIRE .038 STRAIGHT BENTSON TIP (5EA/BX)

## (undated) DEVICE — TUBING CLEARLINK DUO-VENT - C-FLO (48EA/CA)

## (undated) DEVICE — SENSOR SPO2 NEO LNCS ADHESIVE (20/BX) SEE USER NOTES

## (undated) DEVICE — SUCTION INSTRUMENT YANKAUER BULBOUS TIP W/O VENT (50EA/CA)

## (undated) DEVICE — CONTAINER SPECIMEN BAG OR - STERILE 4 OZ W/LID (100EA/CA)

## (undated) DEVICE — KIT ANESTHESIA W/CIRCUIT & 3/LT BAG W/FILTER (20EA/CA)

## (undated) DEVICE — SPONGE GAUZESTER 4 X 4 4PLY - (128PK/CA)

## (undated) DEVICE — PACK SINGLE BASIN - (6/CA)

## (undated) DEVICE — TUBE CONNECT SUCTION CLEAR 120 X 1/4" (50EA/CA)"

## (undated) DEVICE — NEPTUNE 4 PORT MANIFOLD - (20/PK)

## (undated) DEVICE — ELECTRODE BIPOLAR REGULAR CUTTING LOOP URO 24/26 FR (6EA/PK)

## (undated) DEVICE — GOWN WARMING STANDARD FLEX - (30/CA)

## (undated) DEVICE — PROTECTOR ULNA NERVE - (36PR/CA)

## (undated) DEVICE — SYRINGE 30 ML LL (56/BX)

## (undated) DEVICE — SLEEVE, VASO, THIGH, MED

## (undated) DEVICE — CATHETER FOLEY 22 FR 30 CC (12EA/CA)

## (undated) DEVICE — PACK CYSTOSCOPY III - (8/CA)

## (undated) DEVICE — BAG URODRAIN WITH TUBING - (20/CA)

## (undated) DEVICE — PLUG CATHETER DRAIN TUBE PROTECTOR CAP

## (undated) DEVICE — COVER FOOT UNIVERSAL DISP. - (25EA/CA)

## (undated) DEVICE — BAG DRAINAGE URINARY CLOSED 2000ML (20EA/CA)

## (undated) DEVICE — JELLY SURGILUBE STERILE TUBE 4.25 OZ (1/EA)

## (undated) DEVICE — MASK ANESTHESIA ADULT  - (100/CA)

## (undated) DEVICE — SET LEADWIRE 5 LEAD BEDSIDE DISPOSABLE ECG (1SET OF 5/EA)

## (undated) DEVICE — HEAD HOLDER JUNIOR/ADULT

## (undated) DEVICE — ELECTRODE BIPOLAR COAGULATION BALL YELLOW 24 FR (6EA/PK)

## (undated) DEVICE — GLOVE BIOGEL PI INDICATOR SZ 7.0 SURGICAL PF LF - (50/BX 4BX/CA)

## (undated) DEVICE — SODIUM CHL. IRRIGATION 0.9% 3000ML (4EA/CA 65CA/PF)

## (undated) DEVICE — SET EXTENSION WITH 2 PORTS (48EA/CA) ***PART #2C8610 IS A SUBSTITUTE*****

## (undated) DEVICE — LACTATED RINGERS INJ 1000 ML - (14EA/CA 60CA/PF)

## (undated) DEVICE — MASK  AIRWAY SIZE 5 UNIQUE SILICON (10EA/BX)

## (undated) DEVICE — WATER IRRIGATION STERILE 1000ML (12EA/CA)

## (undated) DEVICE — GOWN SURGEONS X-LARGE - DISP. (30/CA)

## (undated) DEVICE — SET IRRIGATION CYSTOSCOPY Y-TYPE L81 IN (20EA/CA)

## (undated) DEVICE — GOWN SURGICAL X-LARGE ULTRA - FILM-REINFORCED (20/CA)